# Patient Record
Sex: FEMALE | Race: WHITE | NOT HISPANIC OR LATINO | Employment: FULL TIME | URBAN - NONMETROPOLITAN AREA
[De-identification: names, ages, dates, MRNs, and addresses within clinical notes are randomized per-mention and may not be internally consistent; named-entity substitution may affect disease eponyms.]

---

## 2017-12-02 ENCOUNTER — HOSPITAL ENCOUNTER (INPATIENT)
Facility: HOSPITAL | Age: 57
LOS: 1 days | Discharge: HOME | DRG: 305 | End: 2017-12-04
Attending: EMERGENCY MEDICINE | Admitting: INTERNAL MEDICINE
Payer: COMMERCIAL

## 2017-12-02 ENCOUNTER — APPOINTMENT (EMERGENCY)
Dept: CT IMAGING | Facility: HOSPITAL | Age: 57
DRG: 305 | End: 2017-12-02
Payer: COMMERCIAL

## 2017-12-02 DIAGNOSIS — I10 ACCELERATED HYPERTENSION: ICD-10-CM

## 2017-12-02 DIAGNOSIS — R20.0 RIGHT UPPER EXTREMITY NUMBNESS: Primary | ICD-10-CM

## 2017-12-02 PROBLEM — Z91.19 MEDICALLY NONCOMPLIANT: Chronic | Status: ACTIVE | Noted: 2017-12-02

## 2017-12-02 PROBLEM — Z91.199 MEDICALLY NONCOMPLIANT: Chronic | Status: ACTIVE | Noted: 2017-12-02

## 2017-12-02 PROBLEM — F32.A DEPRESSION: Chronic | Status: ACTIVE | Noted: 2017-12-02

## 2017-12-02 LAB
ALBUMIN SERPL BCP-MCNC: 4 G/DL (ref 3.5–5)
ALP SERPL-CCNC: 94 U/L (ref 46–116)
ALT SERPL W P-5'-P-CCNC: 31 U/L (ref 12–78)
ANION GAP SERPL CALCULATED.3IONS-SCNC: 8 MMOL/L (ref 4–13)
APTT PPP: 31 SECONDS (ref 23–35)
AST SERPL W P-5'-P-CCNC: 28 U/L (ref 5–45)
BASOPHILS # BLD AUTO: 0.03 THOUSANDS/ΜL (ref 0–0.1)
BASOPHILS NFR BLD AUTO: 1 % (ref 0–1)
BILIRUB SERPL-MCNC: 0.5 MG/DL (ref 0.2–1)
BUN SERPL-MCNC: 17 MG/DL (ref 5–25)
CALCIUM SERPL-MCNC: 9.2 MG/DL (ref 8.3–10.1)
CHLORIDE SERPL-SCNC: 104 MMOL/L (ref 100–108)
CO2 SERPL-SCNC: 29 MMOL/L (ref 21–32)
CREAT SERPL-MCNC: 0.83 MG/DL (ref 0.6–1.3)
EOSINOPHIL # BLD AUTO: 0.04 THOUSAND/ΜL (ref 0–0.61)
EOSINOPHIL NFR BLD AUTO: 1 % (ref 0–6)
ERYTHROCYTE [DISTWIDTH] IN BLOOD BY AUTOMATED COUNT: 11.7 % (ref 11.6–15.1)
GFR SERPL CREATININE-BSD FRML MDRD: 80 ML/MIN/1.73SQ M
GLUCOSE SERPL-MCNC: 94 MG/DL (ref 65–140)
HCT VFR BLD AUTO: 37.3 % (ref 34.8–46.1)
HGB BLD-MCNC: 13 G/DL (ref 11.5–15.4)
HOLD SPECIMEN: NORMAL
INR PPP: 0.97 (ref 0.86–1.16)
LYMPHOCYTES # BLD AUTO: 1.95 THOUSANDS/ΜL (ref 0.6–4.47)
LYMPHOCYTES NFR BLD AUTO: 33 % (ref 14–44)
MCH RBC QN AUTO: 34.9 PG (ref 26.8–34.3)
MCHC RBC AUTO-ENTMCNC: 34.9 G/DL (ref 31.4–37.4)
MCV RBC AUTO: 100 FL (ref 82–98)
MONOCYTES # BLD AUTO: 0.62 THOUSAND/ΜL (ref 0.17–1.22)
MONOCYTES NFR BLD AUTO: 10 % (ref 4–12)
NEUTROPHILS # BLD AUTO: 3.3 THOUSANDS/ΜL (ref 1.85–7.62)
NEUTS SEG NFR BLD AUTO: 55 % (ref 43–75)
PLATELET # BLD AUTO: 269 THOUSANDS/UL (ref 149–390)
PMV BLD AUTO: 8.8 FL (ref 8.9–12.7)
POTASSIUM SERPL-SCNC: 4 MMOL/L (ref 3.5–5.3)
PROT SERPL-MCNC: 6.7 G/DL (ref 6.4–8.2)
PROTHROMBIN TIME: 12.8 SECONDS (ref 12.1–14.4)
RBC # BLD AUTO: 3.72 MILLION/UL (ref 3.81–5.12)
SODIUM SERPL-SCNC: 141 MMOL/L (ref 136–145)
TSH SERPL DL<=0.05 MIU/L-ACNC: 1.33 UIU/ML (ref 0.36–3.74)
WBC # BLD AUTO: 5.94 THOUSAND/UL (ref 4.31–10.16)

## 2017-12-02 PROCEDURE — 96374 THER/PROPH/DIAG INJ IV PUSH: CPT

## 2017-12-02 PROCEDURE — 99285 EMERGENCY DEPT VISIT HI MDM: CPT

## 2017-12-02 PROCEDURE — 80053 COMPREHEN METABOLIC PANEL: CPT | Performed by: EMERGENCY MEDICINE

## 2017-12-02 PROCEDURE — 93005 ELECTROCARDIOGRAM TRACING: CPT | Performed by: EMERGENCY MEDICINE

## 2017-12-02 PROCEDURE — 85025 COMPLETE CBC W/AUTO DIFF WBC: CPT | Performed by: EMERGENCY MEDICINE

## 2017-12-02 PROCEDURE — 70450 CT HEAD/BRAIN W/O DYE: CPT

## 2017-12-02 PROCEDURE — 36415 COLL VENOUS BLD VENIPUNCTURE: CPT | Performed by: EMERGENCY MEDICINE

## 2017-12-02 PROCEDURE — 84443 ASSAY THYROID STIM HORMONE: CPT | Performed by: EMERGENCY MEDICINE

## 2017-12-02 PROCEDURE — 93005 ELECTROCARDIOGRAM TRACING: CPT

## 2017-12-02 PROCEDURE — 85610 PROTHROMBIN TIME: CPT | Performed by: EMERGENCY MEDICINE

## 2017-12-02 PROCEDURE — 85730 THROMBOPLASTIN TIME PARTIAL: CPT | Performed by: EMERGENCY MEDICINE

## 2017-12-02 RX ORDER — HYDRALAZINE HYDROCHLORIDE 20 MG/ML
5 INJECTION INTRAMUSCULAR; INTRAVENOUS ONCE
Status: COMPLETED | OUTPATIENT
Start: 2017-12-02 | End: 2017-12-02

## 2017-12-02 RX ORDER — METOPROLOL TARTRATE 50 MG/1
50 TABLET, FILM COATED ORAL DAILY
COMMUNITY
End: 2017-12-04 | Stop reason: HOSPADM

## 2017-12-02 RX ORDER — POLYETHYLENE GLYCOL 3350 17 G/17G
17 POWDER, FOR SOLUTION ORAL DAILY
Status: DISCONTINUED | OUTPATIENT
Start: 2017-12-02 | End: 2017-12-04 | Stop reason: HOSPADM

## 2017-12-02 RX ORDER — CALCIUM CARBONATE 200(500)MG
1000 TABLET,CHEWABLE ORAL DAILY PRN
Status: DISCONTINUED | OUTPATIENT
Start: 2017-12-02 | End: 2017-12-04 | Stop reason: HOSPADM

## 2017-12-02 RX ORDER — HYDRALAZINE HYDROCHLORIDE 20 MG/ML
10 INJECTION INTRAMUSCULAR; INTRAVENOUS EVERY 6 HOURS PRN
Status: DISCONTINUED | OUTPATIENT
Start: 2017-12-02 | End: 2017-12-04 | Stop reason: HOSPADM

## 2017-12-02 RX ORDER — FLUOXETINE 20 MG/1
20 TABLET, FILM COATED ORAL DAILY
COMMUNITY

## 2017-12-02 RX ORDER — AMLODIPINE BESYLATE 10 MG/1
10 TABLET ORAL DAILY
Status: DISCONTINUED | OUTPATIENT
Start: 2017-12-02 | End: 2017-12-04 | Stop reason: HOSPADM

## 2017-12-02 RX ORDER — ONDANSETRON 2 MG/ML
4 INJECTION INTRAMUSCULAR; INTRAVENOUS EVERY 6 HOURS PRN
Status: DISCONTINUED | OUTPATIENT
Start: 2017-12-02 | End: 2017-12-04 | Stop reason: HOSPADM

## 2017-12-02 RX ORDER — MAGNESIUM HYDROXIDE/ALUMINUM HYDROXICE/SIMETHICONE 120; 1200; 1200 MG/30ML; MG/30ML; MG/30ML
30 SUSPENSION ORAL EVERY 6 HOURS PRN
Status: DISCONTINUED | OUTPATIENT
Start: 2017-12-02 | End: 2017-12-04 | Stop reason: HOSPADM

## 2017-12-02 RX ORDER — METOPROLOL TARTRATE 50 MG/1
50 TABLET, FILM COATED ORAL EVERY 12 HOURS SCHEDULED
Status: DISCONTINUED | OUTPATIENT
Start: 2017-12-02 | End: 2017-12-04 | Stop reason: HOSPADM

## 2017-12-02 RX ORDER — METOPROLOL TARTRATE 50 MG/1
50 TABLET, FILM COATED ORAL DAILY
Status: DISCONTINUED | OUTPATIENT
Start: 2017-12-02 | End: 2017-12-02

## 2017-12-02 RX ORDER — FLUOXETINE HYDROCHLORIDE 20 MG/1
20 CAPSULE ORAL DAILY
Status: DISCONTINUED | OUTPATIENT
Start: 2017-12-02 | End: 2017-12-04 | Stop reason: HOSPADM

## 2017-12-02 RX ORDER — DOCUSATE SODIUM 100 MG/1
100 CAPSULE, LIQUID FILLED ORAL 2 TIMES DAILY
Status: DISCONTINUED | OUTPATIENT
Start: 2017-12-02 | End: 2017-12-04 | Stop reason: HOSPADM

## 2017-12-02 RX ORDER — ACETAMINOPHEN 325 MG/1
650 TABLET ORAL EVERY 6 HOURS PRN
Status: DISCONTINUED | OUTPATIENT
Start: 2017-12-02 | End: 2017-12-04 | Stop reason: HOSPADM

## 2017-12-02 RX ADMIN — METOPROLOL TARTRATE 50 MG: 50 TABLET ORAL at 21:40

## 2017-12-02 RX ADMIN — AMLODIPINE BESYLATE 10 MG: 10 TABLET ORAL at 16:14

## 2017-12-02 RX ADMIN — HYDRALAZINE HYDROCHLORIDE 5 MG: 20 INJECTION INTRAMUSCULAR; INTRAVENOUS at 11:11

## 2017-12-02 RX ADMIN — ACETAMINOPHEN 650 MG: 325 TABLET, FILM COATED ORAL at 21:39

## 2017-12-02 RX ADMIN — ENOXAPARIN SODIUM 40 MG: 40 INJECTION SUBCUTANEOUS at 16:14

## 2017-12-02 RX ADMIN — POLYETHYLENE GLYCOL 3350 17 G: 17 POWDER, FOR SOLUTION ORAL at 16:14

## 2017-12-02 RX ADMIN — FLUOXETINE 20 MG: 20 CAPSULE ORAL at 16:13

## 2017-12-02 NOTE — H&P
History and Physical - [facility]    Patient Information: Steven Chinchilla 56 y o  female MRN: [id number]  Unit/Bed#: 759-12 Encounter: [id number]  Admitting Physician: Ramu Alex MD  PCP: Bonita Traylor DO  Date of Admission:  12/02/17    Assessment/Plan:    Hospital Problem List:     Principal Problem:    Hypertension, accelerated  Active Problems:    Medically noncompliant    Depression      Plan for the Primary Problem(s):  · Uncontrolled hypertension  · Admit to telemetry  · Vitals as per floor protocol  · Cardiac diet, low salt diet  · Ambulate as tolerated  · Labs:  CBC, BMP, LFTs, INR, PTT  · Resume home medications  · IV hydralazine 10 mg q 6 hours as needed for systolic blood pressure greater than 180  · Start patient on Norvasc 5 mg daily  · DVT prophylaxis:  Lovenox 40 mg subcu daily    Plan for Additional Problems:   · Medically noncompliant with home medication  · Depression    VTE Prophylaxis: Enoxaparin (Lovenox)  / sequential compression device   Code Status:  Full code  POLST: POLST is not applicable to this patient    Anticipated Length of Stay:  Patient will be admitted on an Observation basis with an anticipated length of stay of   2 midnights  Justification for Hospital Stay:  Uncontrolled hypertension    Total Time for Visit, including Counseling / Coordination of Care: 45 minutes  Greater than 50% of this total time spent on direct patient counseling and coordination of care  Chief Complaint:   Uncontrolled hypertension/pain and tingling sensation in the right arm     History of Present Illness:    Steven Chinchilla is a 56 y o  female who presents with uncontrolled hypertension and pain and tingling sensation in the right arm which lasted for 1 hour  Patient is noncompliant with home medications  Patient says she takes metoprolol 50 mg p o  daily and her primary care physician stopped lisinopril 10 mg p  o  daily last week    Patient did agree that she is non complaint with her medications  Patient denies headache, blurring of vision, diplopia, chest pain, palpitation, nausea, vomiting, diarrhea, abdominal pain       Review of Systems:    Review of Systems   Neurological: Positive for numbness  All other systems reviewed and are negative  Past Medical and Surgical History:     Past Medical History:   Diagnosis Date    Hypertension        History reviewed  No pertinent surgical history  Meds/Allergies:    Prior to Admission medications    Medication Sig Start Date End Date Taking? Authorizing Provider   FLUoxetine (PROzac) 20 MG tablet Take 20 mg by mouth daily   Yes Historical Provider, MD   metoprolol tartrate (LOPRESSOR) 50 mg tablet Take 50 mg by mouth daily   Yes Historical Provider, MD     I have reviewed home medications with patient personally  Allergies: No Known Allergies    Social History:     Marital Status: Single   Occupation:  Unknown  Patient Pre-hospital Living Situation:  Home  Patient Pre-hospital Level of Mobility:  Good  Patient Pre-hospital Diet Restrictions:  No  Substance Use History:   History   Alcohol Use No     History   Smoking Status    Former Smoker   Smokeless Tobacco    Never Used     History   Drug Use No       Family History:    History reviewed  No pertinent family history  Physical Exam:     Vitals:   Blood Pressure: (!) 184/97 (12/02/17 1300)  Pulse: 65 (12/02/17 1300)  Temperature: 97 6 °F (36 4 °C) (12/02/17 1300)  Temp Source: Temporal (12/02/17 1300)  Respirations: 16 (12/02/17 1300)  Height: 4' 11" (149 9 cm) (12/02/17 1300)  Weight - Scale: 45 kg (99 lb 3 3 oz) (12/02/17 1300)  SpO2: 98 % (12/02/17 1300)    Physical Exam   Constitutional: She is oriented to person, place, and time  She appears well-developed and well-nourished  HENT:   Head: Normocephalic and atraumatic  Eyes: Conjunctivae and EOM are normal  Pupils are equal, round, and reactive to light  Neck: Normal range of motion   Neck supple  Cardiovascular: Normal rate, regular rhythm, normal heart sounds and intact distal pulses  Pulmonary/Chest: Effort normal and breath sounds normal    Abdominal: Soft  Bowel sounds are normal    Musculoskeletal: Normal range of motion  Neurological: She is alert and oriented to person, place, and time  She has normal reflexes  Skin: Skin is warm and dry  Vitals reviewed  Additional Data:     Lab Results: I have personally reviewed pertinent reports  Results from last 7 days  Lab Units 12/02/17  1045   WBC Thousand/uL 5 94   HEMOGLOBIN g/dL 13 0   HEMATOCRIT % 37 3   PLATELETS Thousands/uL 269   NEUTROS PCT % 55   LYMPHS PCT % 33   MONOS PCT % 10   EOS PCT % 1       Results from last 7 days  Lab Units 12/02/17  1045   SODIUM mmol/L 141   POTASSIUM mmol/L 4 0   CHLORIDE mmol/L 104   CO2 mmol/L 29   BUN mg/dL 17   CREATININE mg/dL 0 83   CALCIUM mg/dL 9 2   TOTAL PROTEIN g/dL 6 7   BILIRUBIN TOTAL mg/dL 0 50   ALK PHOS U/L 94   ALT U/L 31   AST U/L 28   GLUCOSE RANDOM mg/dL 94       Results from last 7 days  Lab Units 12/02/17  1045   INR  0 97       Imaging: I have personally reviewed pertinent reports  Ct Head Without Contrast    Result Date: 12/2/2017  Narrative: CT BRAIN - WITHOUT CONTRAST INDICATION:  Right arm numbness COMPARISON:  None  TECHNIQUE:  CT examination of the brain was performed  In addition to axial images, coronal reformatted images were created and submitted for interpretation  Radiation dose length product (DLP) for this visit:  916 mGy-cm   This examination, like all CT scans performed in the [address] [place] [address], was performed utilizing techniques to minimize radiation dose exposure, including the use of iterative reconstruction and automated exposure control  IMAGE QUALITY:  Diagnostic  FINDINGS:  PARENCHYMA:  No intracranial mass, mass effect or midline shift  No CT signs of acute infarction  There is no parenchymal hemorrhage       VENTRICLES AND EXTRA-AXIAL SPACES:  Normal for patient's age  VISUALIZED ORBITS AND PARANASAL SINUSES:  Unremarkable  CALVARIUM AND EXTRACRANIAL SOFT TISSUES:   Normal      Impression: No acute intracranial abnormality  Workstation performed: [id number]       EKG, Pathology, and Other Studies Reviewed on Admission:   · EKG:  No ST T wave changes noted    Allscripts / Epic Records Reviewed: Yes     ** Please Note: This note has been constructed using a voice recognition system   **

## 2017-12-02 NOTE — ED NOTES
Pt ambulated to and from bathroom  Pt tolerated well  Pt resting in bed with call bell in reach  Warm blanket provided        Aleksandra Andres RN  12/02/17 1839

## 2017-12-02 NOTE — ED NOTES
Dr Amador Peacock informed of patients HTN  No new orders at this time       Elli Baer, RN  12/02/17 2942

## 2017-12-03 PROBLEM — I10 HYPERTENSION, ACCELERATED: Status: RESOLVED | Noted: 2017-12-02 | Resolved: 2017-12-03

## 2017-12-03 LAB
ANION GAP SERPL CALCULATED.3IONS-SCNC: 7 MMOL/L (ref 4–13)
BUN SERPL-MCNC: 14 MG/DL (ref 5–25)
CALCIUM SERPL-MCNC: 9.1 MG/DL (ref 8.3–10.1)
CHLORIDE SERPL-SCNC: 102 MMOL/L (ref 100–108)
CO2 SERPL-SCNC: 28 MMOL/L (ref 21–32)
CREAT SERPL-MCNC: 0.7 MG/DL (ref 0.6–1.3)
ERYTHROCYTE [DISTWIDTH] IN BLOOD BY AUTOMATED COUNT: 11.8 % (ref 11.6–15.1)
GFR SERPL CREATININE-BSD FRML MDRD: 98 ML/MIN/1.73SQ M
GLUCOSE SERPL-MCNC: 104 MG/DL (ref 65–140)
HCT VFR BLD AUTO: 40.8 % (ref 34.8–46.1)
HCYS SERPL-SCNC: 7.9 UMOL/L (ref 3.7–11.2)
HGB BLD-MCNC: 14.2 G/DL (ref 11.5–15.4)
MCH RBC QN AUTO: 34.5 PG (ref 26.8–34.3)
MCHC RBC AUTO-ENTMCNC: 34.8 G/DL (ref 31.4–37.4)
MCV RBC AUTO: 99 FL (ref 82–98)
PLATELET # BLD AUTO: 277 THOUSANDS/UL (ref 149–390)
PMV BLD AUTO: 9.5 FL (ref 8.9–12.7)
POTASSIUM SERPL-SCNC: 3.9 MMOL/L (ref 3.5–5.3)
RBC # BLD AUTO: 4.11 MILLION/UL (ref 3.81–5.12)
SODIUM SERPL-SCNC: 137 MMOL/L (ref 136–145)
VIT B12 SERPL-MCNC: 795 PG/ML (ref 100–900)
WBC # BLD AUTO: 5.91 THOUSAND/UL (ref 4.31–10.16)

## 2017-12-03 PROCEDURE — 80048 BASIC METABOLIC PNL TOTAL CA: CPT | Performed by: INTERNAL MEDICINE

## 2017-12-03 PROCEDURE — 82607 VITAMIN B-12: CPT | Performed by: INTERNAL MEDICINE

## 2017-12-03 PROCEDURE — 97162 PT EVAL MOD COMPLEX 30 MIN: CPT

## 2017-12-03 PROCEDURE — G8979 MOBILITY GOAL STATUS: HCPCS

## 2017-12-03 PROCEDURE — G8978 MOBILITY CURRENT STATUS: HCPCS

## 2017-12-03 PROCEDURE — 85027 COMPLETE CBC AUTOMATED: CPT | Performed by: INTERNAL MEDICINE

## 2017-12-03 PROCEDURE — 93005 ELECTROCARDIOGRAM TRACING: CPT | Performed by: INTERNAL MEDICINE

## 2017-12-03 PROCEDURE — 83090 ASSAY OF HOMOCYSTEINE: CPT | Performed by: INTERNAL MEDICINE

## 2017-12-03 RX ORDER — METOPROLOL TARTRATE 50 MG/1
50 TABLET, FILM COATED ORAL EVERY 12 HOURS SCHEDULED
Qty: 60 TABLET | Refills: 0 | Status: SHIPPED | OUTPATIENT
Start: 2017-12-03

## 2017-12-03 RX ORDER — ASPIRIN 81 MG/1
81 TABLET ORAL DAILY
Status: DISCONTINUED | OUTPATIENT
Start: 2017-12-03 | End: 2017-12-04 | Stop reason: HOSPADM

## 2017-12-03 RX ORDER — AMLODIPINE BESYLATE 10 MG/1
10 TABLET ORAL DAILY
Qty: 30 TABLET | Refills: 0 | Status: SHIPPED | OUTPATIENT
Start: 2017-12-04

## 2017-12-03 RX ADMIN — ENOXAPARIN SODIUM 40 MG: 40 INJECTION SUBCUTANEOUS at 13:58

## 2017-12-03 RX ADMIN — POLYETHYLENE GLYCOL 3350 17 G: 17 POWDER, FOR SOLUTION ORAL at 08:33

## 2017-12-03 RX ADMIN — AMLODIPINE BESYLATE 10 MG: 10 TABLET ORAL at 08:33

## 2017-12-03 RX ADMIN — METOPROLOL TARTRATE 50 MG: 50 TABLET ORAL at 08:33

## 2017-12-03 RX ADMIN — METOPROLOL TARTRATE 50 MG: 50 TABLET ORAL at 20:54

## 2017-12-03 RX ADMIN — ASPIRIN 81 MG: 81 TABLET, COATED ORAL at 13:58

## 2017-12-03 RX ADMIN — DOCUSATE SODIUM 100 MG: 100 CAPSULE, LIQUID FILLED ORAL at 17:28

## 2017-12-03 RX ADMIN — DOCUSATE SODIUM 100 MG: 100 CAPSULE, LIQUID FILLED ORAL at 08:33

## 2017-12-03 RX ADMIN — FLUOXETINE 20 MG: 20 CAPSULE ORAL at 08:33

## 2017-12-03 RX ADMIN — ACETAMINOPHEN 650 MG: 325 TABLET, FILM COATED ORAL at 08:32

## 2017-12-03 NOTE — SOCIAL WORK
Cm met with the patient to evaluate the patients prior function and living situation and any barriers to d/c and form a safe d/c plan  Cm also evaluated the patient for any services in the home or needs for services  Cm will follow the patient and address any needs and address all concerns  The patient is at home and she is independent at home with all adls and she uses no dme  Her pcp is Dr Cyndie Marks in [facility] and she does drive and will have no needs on d/c   She was as an observation and she was d/c to home but she states that she had numbness and tingling radiating down her arm and the d/c was cancelled

## 2017-12-03 NOTE — DISCHARGE SUMMARY
Discharge Summary - [facility]    Patient Information: Ceasar Rees 56 y o  female MRN: [id number]  Unit/Bed#: 726-32 Encounter: [id number]    Discharging Physician / Practitioner: Sam Garcia MD  PCP: Ny Munoz DO  Admission Date: 12/2/2017  Discharge Date: 12/03/17    Reason for Admission:  Uncontrolled hypertension  Discharge Diagnoses:     Principal Problem (Resolved): Hypertension, accelerated  Active Problems:    Medically noncompliant    Depression      Consultations During Hospital Stay:  · None    Procedures Performed:     · CT head without contrast    Significant Findings / Test Results:     · CT head without contrast:  No acute intracranial abnormality    Incidental Findings:   · None     Test Results Pending at Discharge (will require follow up): · None     Outpatient Tests Requested:  · None    Complications:  None    Hospital Course:     Ceasar Rees is a 56 y o  female patient who originally presented to the hospital on 12/2/2017 due to uncontrolled hypertension and pain and tingling sensation in the right arm which lasted for 1 hour  Patient is noncompliant with home medications  Patient says she takes metoprolol 50 mg p o  daily and her primary care physician stopped lisinopril 10 mg p  o  daily last week  Patient did agree that she is non complaint with her medications  Patient denies headache, blurring of vision, diplopia, chest pain, palpitation, nausea, vomiting, diarrhea, abdominal pain       Patient was admitted to telemetry under observation  Patient noncompliant with home medications  Metoprolol 50 mg p o  b i d  was initiated and Norvasc 10 mg p  o  daily was also started on the patient  Patient blood pressure came back to normal limits  Patient is asymptomatic  I stressed the importance of being compliant with the home medication to the patient  Advised the patient to follow up with the primary care physician    Patient agreed to the plan  Patient wanted to go home today        Condition at Discharge: good     Discharge Day Visit / Exam:     Subjective:  Patient seen examined at bedside  Patient says she is doing fine and wants to go home today  Patient denies fever, chills, nausea, vomiting, diarrhea, abdominal pain, chest pain, palpitation, shortness of breath, headache, blurring of vision, diplopia, confusion  Vitals: Blood Pressure: 134/76 (12/03/17 0736)  Pulse: 62 (12/03/17 0736)  Temperature: 98 °F (36 7 °C) (12/03/17 0736)  Temp Source: Temporal (12/03/17 0736)  Respirations: 16 (12/03/17 0736)  Height: 4' 11" (149 9 cm) (12/02/17 1300)  Weight - Scale: 43 7 kg (96 lb 6 4 oz) (12/03/17 0600)  SpO2: 97 % (12/03/17 0736)  Exam:   Physical Exam   Constitutional: She is oriented to person, place, and time  She appears well-developed and well-nourished  HENT:   Head: Normocephalic and atraumatic  Eyes: Conjunctivae and EOM are normal  Pupils are equal, round, and reactive to light  Neck: Normal range of motion  Neck supple  Cardiovascular: Normal rate, regular rhythm, normal heart sounds and intact distal pulses  Pulmonary/Chest: Effort normal and breath sounds normal    Abdominal: Soft  Bowel sounds are normal    Musculoskeletal: Normal range of motion  Neurological: She is alert and oriented to person, place, and time  She has normal reflexes  Skin: Skin is warm and dry  Vitals reviewed  Discharge instructions/Information to patient and family:   See after visit summary for information provided to patient and family  Provisions for Follow-Up Care:  See after visit summary for information related to follow-up care and any pertinent home health orders  Disposition:     Home    For Discharges to [facility]:   · Not Applicable to this Patient - Not Applicable to this Patient    Planned Readmission:  No     Discharge Statement:  I spent 60 minutes discharging the patient   This time was spent on the day of discharge  I had direct contact with the patient on the day of discharge  Greater than 50% of the total time was spent examining patient, answering all patient questions, arranging and discussing plan of care with patient as well as directly providing post-discharge instructions  Additional time then spent on discharge activities  Discharge Medications:  See after visit summary for reconciled discharge medications provided to patient and family        ** Please Note: This note has been constructed using a voice recognition system **

## 2017-12-03 NOTE — DISCHARGE INSTRUCTIONS
Chronic Hypertension, Ambulatory Care   GENERAL INFORMATION:   Chronic hypertension  is a long-term condition in which your blood pressure (BP) is higher than normal  Your BP is the force of your blood moving against the walls of your arteries  Hypertension is a BP of 140/90 or higher  Common symptoms include the following:   · Headache     · Blurred vision    · Chest pain     · Dizziness or weakness     · Trouble breathing     · Nosebleeds  Seek immediate care for the following symptoms:   · Severe headache or vision loss    · Weakness in an arm or leg    · Confusion or difficulty speaking    · Discomfort in your chest that feels like squeezing, pressure, fullness, or pain    · Suddenly feeling lightheaded or trouble breathing    · Pain or discomfort in your back, neck, jaw, stomach, or arm  Treatment for chronic hypertension  may include medicine to lower your BP  You may also need to make lifestyle changes  Take your medicine exactly as directed  Manage chronic hypertension:   · Take your BP at home  Sit and rest for 5 minutes before you take your BP  Extend your arm and support it on a flat surface  Your arm should be at the same level as your heart  Follow the directions that came with your BP monitor  If possible, take at least 2 BP readings each time  Take your BP at least twice a day at the same times each day, such as morning and evening  Keep a log of your BP readings and bring it to your follow-up visits  · Eat less sodium (salt)  Do not add sodium to your food  Limit foods that are high in sodium, such as canned foods, potato chips, and cold cuts  Your healthcare provider may suggest that you follow the [address] [place] [address] Street  The plan is low in sodium, unhealthy fats, and total fat  It is high in potassium, calcium, and fiber  · Exercise regularly  Exercise at least 30 minutes per day, on most days of the week  This will help decrease your BP   Ask your healthcare provider about the best exercise plan for you  · Limit alcohol  Women should limit alcohol to 1 drink a day  Men should limit alcohol to 2 drinks a day  A drink of alcohol is 12 ounces of beer, 5 ounces of wine, or 1½ ounces of liquor  · Do not smoke  If you smoke, it is never too late to quit  Smoking can increase your BP  Smoking also worsens other health conditions you may have that can increase your risk for hypertension  Ask your healthcare provider for information if you need help quitting  Follow up with your healthcare provider as directed: You will need to return to have your BP checked and to have other lab tests done  Write down your questions so you remember to ask them during your visits  CARE AGREEMENT:   You have the right to help plan your care  Learn about your health condition and how it may be treated  Discuss treatment options with your caregivers to decide what care you want to receive  You always have the right to refuse treatment  The above information is an  only  It is not intended as medical advice for individual conditions or treatments  Talk to your doctor, nurse or pharmacist before following any medical regimen to see if it is safe and effective for you  © 2014 [address] is for End User's use only and may not be sold, redistributed or otherwise used for commercial purposes  All illustrations and images included in CareNotes® are the copyrighted property of [employer]  or [employer]  Chronic Hypertension   AMBULATORY CARE:   Hypertension  is high blood pressure (BP)  Your BP is the force of your blood moving against the walls of your arteries  Normal BP is less than 120/80  Prehypertension is between 120/80 and 139/89  Hypertension is 140/90 or higher  Hypertension causes your BP to get so high that your heart has to work much harder than normal  This can damage your heart   Chronic hypertension is a long-term condition that you can control with a healthy lifestyle or medicines  A controlled blood pressure helps protect your organs, such as your heart, lungs, brain, and kidneys  Common symptoms include the following:   · Headache     · Blurred vision    · Chest pain     · Dizziness or weakness     · Trouble breathing     · Nosebleeds  Call 911 for any of the following:   · You have discomfort in your chest that feels like squeezing, pressure, fullness, or pain  · You become confused or have difficulty speaking  · You suddenly feel lightheaded or have trouble breathing  · You have pain or discomfort in your back, neck, jaw, stomach, or arm  Seek care immediately if:   · You have a severe headache or vision loss  · You have weakness in an arm or leg  Contact your healthcare provider if:   · You feel faint, dizzy, confused, or drowsy  · You have been taking your BP medicine and your BP is still higher than your healthcare provider says it should be  · You have questions or concerns about your condition or care  Treatment for chronic hypertension  may include medicine to lower your BP and lower your cholesterol level  A low cholesterol level helps prevent heart disease and makes it easier to control your blood pressure  Heart disease can make your blood pressure harder to control  You may also need to make lifestyle changes  Take your medicine exactly as directed  Manage chronic hypertension:  Talk with your healthcare provider about these and other ways to manage hypertension:  · Take your BP at home  Sit and rest for 5 minutes before you take your BP  Extend your arm and support it on a flat surface  Your arm should be at the same level as your heart  Follow the directions that came with your BP monitor  If possible, take at least 2 BP readings each time  Take your BP at least twice a day at the same times each day, such as morning and evening  Keep a record of your BP readings and bring it to your follow-up visits   Ask your healthcare provider what your blood pressure should be  · Limit sodium (salt) as directed  Too much sodium can affect your fluid balance  Check labels to find low-sodium or no-salt-added foods  Some low-sodium foods use potassium salts for flavor  Too much potassium can also cause health problems  Your healthcare provider will tell you how much sodium and potassium are safe for you to have in a day  He or she may recommend that you limit sodium to 2,300 mg a day  · Follow the meal plan recommended by your healthcare provider  A dietitian or your provider can give you more information on low-sodium plans or the DASH (Dietary Approaches to Stop Hypertension) eating plan  The DASH plan is low in sodium, unhealthy fats, and total fat  It is high in potassium, calcium, and fiber  · Exercise to maintain a healthy weight  Exercise at least 30 minutes per day, on most days of the week  This will help decrease your blood pressure  Ask about the best exercise plan for you  · Decrease stress  This may help lower your BP  Learn ways to relax, such as deep breathing or listening to music  · Limit alcohol  Women should limit alcohol to 1 drink a day  Men should limit alcohol to 2 drinks a day  A drink of alcohol is 12 ounces of beer, 5 ounces of wine, or 1½ ounces of liquor  · Do not smoke  Nicotine and other chemicals in cigarettes and cigars can increase your BP and also cause lung damage  Ask your healthcare provider for information if you currently smoke and need help to quit  E-cigarettes or smokeless tobacco still contain nicotine  Talk to your healthcare provider before you use these products  Follow up with your healthcare provider as directed: You will need to return to have your BP checked and to have other lab tests done  Write down your questions so you remember to ask them during your visits     © 2017 [employer] Information is for End User's use only and may not be sold, redistributed or otherwise used for commercial purposes  All illustrations and images included in CareNotes® are the copyrighted property of [employer] [facility] , [employer]  or [employer]  The above information is an  only  It is not intended as medical advice for individual conditions or treatments  Talk to your doctor, nurse or pharmacist before following any medical regimen to see if it is safe and effective for you  Chronic Hypertension   WHAT YOU NEED TO KNOW:   Hypertension is high blood pressure (BP)  Your BP is the force of your blood moving against the walls of your arteries  Normal BP is less than 120/80  Prehypertension is between 120/80 and 139/89  Hypertension is 140/90 or higher  Hypertension causes your BP to get so high that your heart has to work much harder than normal  This can damage your heart  Chronic hypertension is a long-term condition that you can control with a healthy lifestyle or medicines  A controlled blood pressure helps protect your organs, such as your heart, lungs, brain, and kidneys  DISCHARGE INSTRUCTIONS:   Call 911 for any of the following:   · You have discomfort in your chest that feels like squeezing, pressure, fullness, or pain  · You become confused or have difficulty speaking  · You suddenly feel lightheaded or have trouble breathing  · You have pain or discomfort in your back, neck, jaw, stomach, or arm  Seek care immediately if:   · You have a severe headache or vision loss  · You have weakness in an arm or leg  Contact your healthcare provider if:   · You feel faint, dizzy, confused, or drowsy  · You have been taking your BP medicine and your BP is still higher than your healthcare provider says it should be  · You have questions or concerns about your condition or care  Medicines: You may need any of the following:  · Medicine  may be used to help lower your BP   You may need more than one type of medicine  Take the medicine exactly as directed  · Diuretics  help decrease extra fluid that collects in your body  This will help lower your BP  You may urinate more often while you take this medicine  · Cholesterol medicine  helps lower your cholesterol level  A low cholesterol level helps prevent heart disease and makes it easier to control your blood pressure  · Take your medicine as directed  Contact your healthcare provider if you think your medicine is not helping or if you have side effects  Tell him or her if you are allergic to any medicine  Keep a list of the medicines, vitamins, and herbs you take  Include the amounts, and when and why you take them  Bring the list or the pill bottles to follow-up visits  Carry your medicine list with you in case of an emergency  Follow up with your healthcare provider as directed: You will need to return to have your blood pressure checked and to have other lab tests done  Write down your questions so you remember to ask them during your visits  Manage chronic hypertension:  Talk with your healthcare provider about these and other ways to manage hypertension:  · Take your BP at home  Sit and rest for 5 minutes before you take your BP  Extend your arm and support it on a flat surface  Your arm should be at the same level as your heart  Follow the directions that came with your BP monitor  If possible, take at least 2 BP readings each time  Take your BP at least twice a day at the same times each day, such as morning and evening  Keep a record of your BP readings and bring it to your follow-up visits  Ask your healthcare provider what your blood pressure should be  · Limit sodium (salt) as directed  Too much sodium can affect your fluid balance  Check labels to find low-sodium or no-salt-added foods  Some low-sodium foods use potassium salts for flavor  Too much potassium can also cause health problems   Your healthcare provider will tell you how much sodium and potassium are safe for you to have in a day  He or she may recommend that you limit sodium to 2,300 mg a day  · Follow the meal plan recommended by your healthcare provider  A dietitian or your provider can give you more information on low-sodium plans or the DASH (Dietary Approaches to Stop Hypertension) eating plan  The DASH plan is low in sodium, unhealthy fats, and total fat  It is high in potassium, calcium, and fiber  · Exercise to maintain a healthy weight  Exercise at least 30 minutes per day, on most days of the week  This will help decrease your blood pressure  Ask about the best exercise plan for you  · Decrease stress  This may help lower your BP  Learn ways to relax, such as deep breathing or listening to music  · Limit alcohol  Women should limit alcohol to 1 drink a day  Men should limit alcohol to 2 drinks a day  A drink of alcohol is 12 ounces of beer, 5 ounces of wine, or 1½ ounces of liquor  · Do not smoke  Nicotine and other chemicals in cigarettes and cigars can increase your BP and also cause lung damage  Ask your healthcare provider for information if you currently smoke and need help to quit  E-cigarettes or smokeless tobacco still contain nicotine  Talk to your healthcare provider before you use these products  © 2017 [address]  Information is for End User's use only and may not be sold, redistributed or otherwise used for commercial purposes  All illustrations and images included in CareNotes® are the copyrighted property of A [employer] A M [employer]  or [employer]  The above information is an  only  It is not intended as medical advice for individual conditions or treatments  Talk to your doctor, nurse or pharmacist before following any medical regimen to see if it is safe and effective for you  Chronic Hypertension   WHAT YOU NEED TO KNOW:   What is chronic hypertension?   Hypertension is high blood pressure (BP)  Your BP is the force of your blood moving against the walls of your arteries  Normal BP is less than 120/80  Prehypertension is between 120/80 and 139/89  Hypertension is 140/90 or higher  Hypertension causes your BP to get so high that your heart has to work much harder than normal  This can damage your heart  Chronic hypertension is a long-term condition that you can control with a healthy lifestyle or medicines  A controlled blood pressure helps protect your organs, such as your heart, lungs, brain, and kidneys  What increases my risk for chronic hypertension? · Age older than 54 years (men)    · Age older than 72 years (women)    · A family history of hypertension or heart disease     · Obesity or lack of exercise     · Eating too much sodium (salt)    · Stress     · Use of tobacco, alcohol, or illegal drugs     · A medical condition, such as diabetes, high cholesterol, or kidney disease    · Medicines, such as steroids or birth control pills  What are the signs and symptoms of chronic hypertension? You may have no signs or symptoms, or you may have any of the following:  · Headache     · Blurred vision    · Chest pain     · Dizziness or weakness     · Trouble breathing     · Nosebleeds  How is chronic hypertension diagnosed? Your healthcare provider will ask about your symptoms and the medicines you take  He will also ask if you have a family history of high blood pressure and about any health conditions you have  He will also check your blood pressure and weight and examine your heart, lungs, and eyes  You may need any of the following tests:  · Blood tests  may help healthcare providers find the cause of your hypertension  Blood tests can also help find other health problems caused by hypertension  · Urine tests  will be done to check your kidneys  How is chronic hypertension treated? Your healthcare provider will recommend lifestyle changes to lower your BP   You may also need the following medicines:  · Medicine  may be used to help lower your B  You may need more than one type of medicine  Take the medicine exactly as directed  · Diuretics  help decrease extra fluid that collects in your body  This will help lower your BP  You may urinate more often while you take this medicine  · Cholesterol medicine  helps lower your cholesterol level  A low cholesterol level helps prevent heart disease and makes it easier to control your blood pressure  What can I do to manage chronic hypertension? Talk with your healthcare provider about these and other ways to manage chronic hypertension:  · Take your BP at home  Sit and rest for 5 minutes before you take your BP  Extend your arm and support it on a flat surface  Your arm should be at the same level as your heart  Follow the directions that came with your BP monitor  If possible, take at least 2 BP readings each time  Take your BP at least twice a day at the same times each day, such as morning and evening  Keep a record of your BP readings and bring it to your follow-up visits  Ask your healthcare provider what your blood pressure should be  · Limit sodium (salt) as directed  Too much sodium can affect your fluid balance  Check labels to find low-sodium or no-salt-added foods  Some low-sodium foods use potassium salts for flavor  Too much potassium can also cause health problems  Your healthcare provider will tell you how much sodium and potassium are safe for you to have in a day  He or she may recommend that you limit sodium to 2,300 mg a day  · Follow the meal plan recommended by your healthcare provider  A dietitian or your provider can give you more information on low-sodium plans or the DASH (Dietary Approaches to Stop Hypertension) eating plan  The DASH plan is low in sodium, unhealthy fats, and total fat  It is high in potassium, calcium, and fiber  · Exercise to maintain a healthy weight  Exercise at least 30 minutes per day, on most days of the week  This will help decrease your blood pressure  Ask about the best exercise plan for you  · Decrease stress  This may help lower your BP  Learn ways to relax, such as deep breathing or listening to music  · Limit alcohol  Women should limit alcohol to 1 drink a day  Men should limit alcohol to 2 drinks a day  A drink of alcohol is 12 ounces of beer, 5 ounces of wine, or 1½ ounces of liquor  · Do not smoke  Nicotine and other chemicals in cigarettes and cigars can increase your BP and also cause lung damage  Ask your healthcare provider for information if you currently smoke and need help to quit  E-cigarettes or smokeless tobacco still contain nicotine  Talk to your healthcare provider before you use these products  Call 911 for any of the following:   · You have discomfort in your chest that feels like squeezing, pressure, fullness, or pain  · You become confused or have difficulty speaking  · You suddenly feel lightheaded or have trouble breathing  · You have pain or discomfort in your back, neck, jaw, stomach, or arm  When should I seek immediate care? · You have a severe headache or vision loss  · You have weakness in an arm or leg  When should I contact my healthcare provider? · You feel faint, dizzy, confused, or drowsy  · You have been taking your BP medicine and your BP is still higher than your healthcare provider says it should be  · You have questions or concerns about your condition or care  CARE AGREEMENT:   You have the right to help plan your care  Learn about your health condition and how it may be treated  Discuss treatment options with your caregivers to decide what care you want to receive  You always have the right to refuse treatment  The above information is an  only  It is not intended as medical advice for individual conditions or treatments   Talk to your doctor, nurse or pharmacist before following any medical regimen to see if it is safe and effective for you  © 2017 [address] Information is for End User's use only and may not be sold, redistributed or otherwise used for commercial purposes  All illustrations and images included in CareNotes® are the copyrighted property of A [employer]  or [employer]  Depression   WHAT YOU NEED TO KNOW:   Depression is a medical condition that causes feelings of sadness or hopelessness that do not go away  Depression may cause you to lose interest in things you used to enjoy  These feelings may interfere with your daily life  DISCHARGE INSTRUCTIONS:   Call 911 for any of the following:   · You think about harming yourself or someone else  Contact your healthcare provider if:   · Your symptoms do not improve  · You cannot make it to your next appointment  · You have new symptoms  · You have questions or concerns about your condition or care  Medicines:   · Antidepressants  may be given to improve or balance your mood  You may need to take this medicine for several weeks before you begin to feel better  · Take your medicine as directed  Contact your healthcare provider if you think your medicine is not helping or if you have side effects  Tell him of her if you are allergic to any medicine  Keep a list of the medicines, vitamins, and herbs you take  Include the amounts, and when and why you take them  Bring the list or the pill bottles to follow-up visits  Carry your medicine list with you in case of an emergency  Therapy  may be used to treat your depression  A therapist will help you learn to cope with your thoughts and feelings  This can be done alone or in a group  It may also be done with family members or a significant other  Self-care:   · Get regular physical activity  Try to exercise for 30 minutes, 3 to 5 days a week   Work with your healthcare provider to develop an exercise plan that you enjoy  Physical activity may improve your symptoms  · Get enough sleep  Create a routine to help you relax before bed  You can listen to music, read, or do yoga  Try to go to bed and wake up at the same time every day  Sleep is important for emotional health  · Eat a variety of healthy foods from all of the food groups  A healthy meal plan is low in fat, salt, and added sugar  Ask your healthcare provider for more information about a meal plan that is right for you  · Do not drink alcohol or use drugs  Alcohol and drugs can make your symptoms worse  Follow up with your healthcare provider as directed: Your healthcare provider will monitor your progress at follow-up visits  He or she will also monitor your medicine if you take antidepressants  Your healthcare provider will ask if the medicine is helping  Tell him or her about any side effects or problems you may have with your medicine  The type or amount of medicine may need to be changed  Write down your questions so you remember to ask them during your visits  © 2017 [address]  Information is for End User's use only and may not be sold, redistributed or otherwise used for commercial purposes  All illustrations and images included in CareNotes® are the copyrighted property of [employer]  or [employer]  The above information is an  only  It is not intended as medical advice for individual conditions or treatments  Talk to your doctor, nurse or pharmacist before following any medical regimen to see if it is safe and effective for you  Depression   AMBULATORY CARE:   Depression  is a medical condition that causes feelings of sadness or hopelessness that do not go away  Depression may cause you to lose interest in things you used to enjoy  These feelings may interfere with your daily life    Common symptoms include the following:   · Appetite changes, or weight gain or loss    · Trouble going to sleep or staying asleep, or sleeping too much    · Fatigue or lack of energy    · Feeling restless, irritable, or withdrawn    · Feeling worthless, hopeless, discouraged, or guilty    · Trouble concentrating, remembering things, doing daily tasks, or making decisions    · Thoughts about hurting or killing yourself  Call 911 for any of the following:   · You think about harming yourself or someone else  Contact your healthcare provider if:   · Your symptoms do not improve  · You cannot make it to your next appointment  · You have new symptoms  · You have questions or concerns about your condition or care  Treatment for depression  may include medicine to improve or balance your mood  Therapy may also be used to treat your depression  A therapist will help you learn to cope with your thoughts and feelings  Therapy can be done alone or in a group  It may also be done with family members or a significant other  Self-care:   · Get regular physical activity  Try to exercise for 30 minutes, 3 to 5 days a week  Work with your healthcare provider to develop an exercise plan that you enjoy  Physical activity may improve your symptoms  · Get enough sleep  Create a routine to help you relax before bed  You can listen to music, read, or do yoga  Try to go to bed and wake up at the same time every day  Sleep is important for emotional health  · Eat a variety of healthy foods from all of the food groups  A healthy meal plan is low in fat, salt, and added sugar  Ask your healthcare provider for more information about a meal plan that is right for you  · Do not drink alcohol or use drugs  Alcohol and drugs can make your symptoms worse  Follow up with your healthcare provider as directed: Your healthcare provider will monitor your progress at follow-up visits  He or she will also monitor your medicine if you take antidepressants  Your healthcare provider will ask if the medicine is helping   Tell him or her about any side effects or problems you may have with your medicine  The type or amount of medicine may need to be changed  Write down your questions so you remember to ask them during your visits  © 2017 [address] Information is for End User's use only and may not be sold, redistributed or otherwise used for commercial purposes  All illustrations and images included in CareNotes® are the copyrighted property of [employer]  or [employer]  The above information is an  only  It is not intended as medical advice for individual conditions or treatments  Talk to your doctor, nurse or pharmacist before following any medical regimen to see if it is safe and effective for you  Depression, Ambulatory Care   GENERAL INFORMATION:   Depression  is a medical condition that causes feelings of sadness or hopelessness that do not go away  Depression may cause you to lose interest in things you used to enjoy  These feelings may interfere with your daily life  Common symptoms include the following:   · Appetite changes, or weight gain or loss    · Trouble going to sleep or staying asleep, or sleeping too much    · Fatigue or lack of energy    · Feeling restless, irritable, or withdrawn    · Feeling worthless, hopeless, discouraged, or very guilty    · Trouble concentrating, remembering things, doing daily tasks, or making decisions    · Thoughts about hurting or killing yourself  Seek immediate care for the following symptoms:   · You think about harming yourself or someone else  Treatment for depression  may include medicine to improve or balance your mood  Therapy may also be used to treat your depression  A therapist will help you learn to cope with your thoughts and feelings  Therapy can be done alone or in a group  It may also be done with family members or a significant other  Manage depression:   · Get regular physical activity    Try to exercise for 30 minutes, 3 to 5 days a week  Work with your healthcare provider to develop an exercise plan that you enjoy  · Get enough sleep  Create a routine to help you relax before bed  Try to go to bed and wake up at the same time every day  Sleep is important for emotional health  · Eat a variety of healthy foods  Healthy foods include fruits, vegetables, whole-grain breads, low-fat dairy products, beans, lean meats, and fish  A healthy meal plan is low in fat, salt, and added sugar  · Avoid or limit alcohol  Ask your healthcare provider how much alcohol is safe for you to drink  A drink of alcohol is 12 ounces of beer, 5 ounces of wine, or 1½ ounces of liquor  Follow up with your healthcare provider as directed: You will need to return so your healthcare provider can monitor your progress  Write down your questions so you remember to ask them during your visits  CARE AGREEMENT:   You have the right to help plan your care  Learn about your health condition and how it may be treated  Discuss treatment options with your caregivers to decide what care you want to receive  You always have the right to refuse treatment  The above information is an  only  It is not intended as medical advice for individual conditions or treatments  Talk to your doctor, nurse or pharmacist before following any medical regimen to see if it is safe and effective for you  © 2014 [address] is for End User's use only and may not be sold, redistributed or otherwise used for commercial purposes  All illustrations and images included in CareNotes® are the copyrighted property of [employer]  or [employer]  Depression   WHAT YOU NEED TO KNOW:   Depression is a medical condition that causes feelings of sadness or hopelessness that do not go away  Depression may cause you to lose interest in things you used to enjoy  These feelings may interfere with your daily life     DISCHARGE INSTRUCTIONS:   Call 911 for any of the following:   · You think about harming yourself or someone else  Contact your healthcare provider if:   · Your symptoms do not improve  · You cannot make it to your next appointment  · You have new symptoms  · You have questions or concerns about your condition or care  Medicines:   · Antidepressants  may be given to improve or balance your mood  You may need to take this medicine for several weeks before you begin to feel better  · Take your medicine as directed  Contact your healthcare provider if you think your medicine is not helping or if you have side effects  Tell him of her if you are allergic to any medicine  Keep a list of the medicines, vitamins, and herbs you take  Include the amounts, and when and why you take them  Bring the list or the pill bottles to follow-up visits  Carry your medicine list with you in case of an emergency  Therapy  may be used to treat your depression  A therapist will help you learn to cope with your thoughts and feelings  This can be done alone or in a group  It may also be done with family members or a significant other  Self-care:   · Get regular physical activity  Try to exercise for 30 minutes, 3 to 5 days a week  Work with your healthcare provider to develop an exercise plan that you enjoy  Physical activity may improve your symptoms  · Get enough sleep  Create a routine to help you relax before bed  You can listen to music, read, or do yoga  Try to go to bed and wake up at the same time every day  Sleep is important for emotional health  · Eat a variety of healthy foods from all of the food groups  A healthy meal plan is low in fat, salt, and added sugar  Ask your healthcare provider for more information about a meal plan that is right for you  · Do not drink alcohol or use drugs  Alcohol and drugs can make your symptoms worse  Follow up with your healthcare provider as directed:   Your healthcare provider will monitor your progress at follow-up visits  He or she will also monitor your medicine if you take antidepressants  Your healthcare provider will ask if the medicine is helping  Tell him or her about any side effects or problems you may have with your medicine  The type or amount of medicine may need to be changed  Write down your questions so you remember to ask them during your visits  © 2017 [facility] Information is for End User's use only and may not be sold, redistributed or otherwise used for commercial purposes  All illustrations and images included in CareNotes® are the copyrighted property of [employer]  or [employer]  The above information is an  only  It is not intended as medical advice for individual conditions or treatments  Talk to your doctor, nurse or pharmacist before following any medical regimen to see if it is safe and effective for you  Depression   WHAT YOU NEED TO KNOW:   What is depression? Depression is a medical condition that causes feelings of sadness or hopelessness that do not go away  Depression may cause you to lose interest in things you used to enjoy  These feelings may interfere with your daily life  What causes or increases my risk for depression? Depression may be caused by changes in brain chemicals that affect your mood  Your risk for depression may be higher if you have any of the following:  · Stressful events such as the death of a loved one, unemployment, childhood trauma, divorce, or domestic abuse    · A chronic medical condition such as diabetes, heart disease, or cancer    · Parents, siblings, or other family members with a history of depression    · Drug or alcohol abuse  What are the signs and symptoms of depression?    · Appetite changes, or weight gain or loss    · Trouble going to sleep or staying asleep, or sleeping too much    · Fatigue or lack of energy    · Feeling restless, irritable, or withdrawn    · Feeling worthless, hopeless, discouraged, or guilty    · Trouble concentrating, remembering things, doing daily tasks, or making decisions    · Thoughts about hurting or killing yourself  How is depression diagnosed? Your healthcare provider will ask about your symptoms and how long you have had them  He or she will ask if you have any family members with depression  Tell your healthcare provider about any stressful events in your life  He or she may ask about any other health conditions or medicines you take  How is depression treated? · Therapy  may be used to treat your depression  A therapist will help you learn to cope with your thoughts and feelings  This can be done alone or in a group  It may also be done with family members or a significant other  · Antidepressant medicine  may be given to improve or balance your mood  You may need to take this medicine for several weeks before you begin to feel better  Tell your healthcare provider about any side effects or problems you have with your medicine  The type or amount of medicine may need to be changed  How can I manage depression? · Get regular physical activity  Try to exercise for 30 minutes, 3 to 5 days a week  Work with your healthcare provider to develop an exercise plan that you enjoy  Physical activity may improve your symptoms  · Get enough sleep  Create a routine to help you relax before bed  You can listen to music, read, or do yoga  Try to go to bed and wake up at the same time every day  Sleep is important for emotional health  · Eat a variety of healthy foods from all of the food groups  A healthy meal plan is low in fat, salt, and added sugar  Ask your healthcare provider for more information about a meal plan that is right for you  · Do not drink alcohol or use drugs  Alcohol and drugs can make your symptoms worse    Call 911 for any of the following:   · You think about harming yourself or someone else     When should I contact my healthcare provider? · Your symptoms do not improve  · You cannot make it to your next appointment  · You have new symptoms  · You have questions or concerns about your condition or care  CARE AGREEMENT:   You have the right to help plan your care  Learn about your health condition and how it may be treated  Discuss treatment options with your caregivers to decide what care you want to receive  You always have the right to refuse treatment  The above information is an  only  It is not intended as medical advice for individual conditions or treatments  Talk to your doctor, nurse or pharmacist before following any medical regimen to see if it is safe and effective for you  © 2017 [address] Information is for End User's use only and may not be sold, redistributed or otherwise used for commercial purposes  All illustrations and images included in CareNotes® are the copyrighted property of [employer]  or [employer]  Depressive Disorder in Adolescents   WHAT YOU NEED TO KNOW:   A depressive disorder is a medical condition that causes feelings of sadness or hopelessness that do not go away  These feelings last longer than usual It is more than feeling down in the dumps  Depressive disorders cause you to lose interest in things and sometimes the people you used to enjoy  These feelings interfere with your daily life  Do not wait for your feelings to go away  A depressive disorder can be treated  Treatment can help you feel better  DISCHARGE INSTRUCTIONS:   Call 911 if:   · You feel like you could harm yourself or someone else  You or someone close to you should contact your healthcare provider if:   · Your symptoms do not improve  · You cannot make it to your next appointment  · You have new symptoms  · You have questions or concerns about your condition or care  Talk to your parent or an adult:   If you feel like you cannot talk to your parents, talk to a school nurse or counselor  Tell someone about your feelings and thoughts  Tell him or her if you feel like you might harm yourself  Tell him or her if you are being bullied by someone  Talk with your friends: Your friends can listen and understand how you feel  Your friends can support you  Contact a crisis hotline: There are many crisis hotlines with someone available to help you 24 hours a day, 7 days a week  · Call the crisis hotline at 3-603.415.2081  Text "GO" to 886541 if you are not comfortable talking to someone you know  · Find the number for a crisis hotline in your area and keep it with you at all times  Things that may help improve your mood:   · Eat healthy foods  · Do physical activity every day, such as walking or running  · Get out in the daylight  · Get enough sleep  · Focus on positive things, even the small things  · Keep a journal     · Use music or art to be creative  · Raina Hamburger out with positive people  · Do not use alcohol or drugs  Follow up with you healthcare provider as directed: Take your journal  Write down your questions so you remember to ask them during your visits  © 2017 [address] Information is for End User's use only and may not be sold, redistributed or otherwise used for commercial purposes  All illustrations and images included in CareNotes® are the copyrighted property of [employer]  or [employer]  The above information is an  only  It is not intended as medical advice for individual conditions or treatments  Talk to your doctor, nurse or pharmacist before following any medical regimen to see if it is safe and effective for you  Hypertension   WHAT YOU NEED TO KNOW:   Hypertension is high blood pressure (BP)  Your BP is the force of your blood moving against the walls of your arteries  Normal BP is less than 120/80   Prehypertension is between 120/80 and 139/89  Hypertension is 140/90 or higher  Hypertension causes your BP to get so high that your heart has to work much harder than normal  This can damage your heart  You can control hypertension with a healthy lifestyle or medicines  A controlled blood pressure helps protect your organs, such as your heart, lungs, brain, and kidneys  DISCHARGE INSTRUCTIONS:   Call 911 for any of the following:   · You have discomfort in your chest that feels like squeezing, pressure, fullness, or pain  · You become confused or have difficulty speaking  · You suddenly feel lightheaded or have trouble breathing  · You have pain or discomfort in your back, neck, jaw, stomach, or arm  Seek care immediately if:   · You have a severe headache or vision loss  · You have weakness in an arm or leg  Contact your healthcare provider if:   · You feel faint, dizzy, confused, or drowsy  · You have been taking your BP medicine and your BP is still higher than your healthcare provider says it should be  · You have questions or concerns about your condition or care  Medicines: You may  need any of the following:  · Medicine  may be used to help lower your BP  You may need more than one type of medicine  Take the medicine exactly as directed  · Diuretics  help decrease extra fluid that collects in your body  This will help lower your BP  You may urinate more often while you take this medicine  · Cholesterol medicine  helps lower your cholesterol level  A low cholesterol level helps prevent heart disease and makes it easier to control your blood pressure  · Take your medicine as directed  Contact your healthcare provider if you think your medicine is not helping or if you have side effects  Tell him or her if you are allergic to any medicine  Keep a list of the medicines, vitamins, and herbs you take  Include the amounts, and when and why you take them   Bring the list or the pill bottles to follow-up visits  Carry your medicine list with you in case of an emergency  Follow up with your healthcare provider as directed: You will need to return to have your BP checked and to have other lab tests done  Write down your questions so you remember to ask them during your visits  Manage hypertension:  Talk with your healthcare provider about these and other ways to manage hypertension:  · Check your BP at home  Sit and rest for 5 minutes before you take your BP  Extend your arm and support it on a flat surface  Your arm should be at the same level as your heart  Follow the directions that came with your BP monitor  If possible, take at least 2 BP readings each time  Take your BP at least twice a day at the same times each day, such as morning and evening  Keep a record of your BP readings and bring it to your follow-up visits  Ask your healthcare provider what your BP should be  · Limit sodium (salt) as directed  Too much sodium can affect your fluid balance  Check labels to find low-sodium or no-salt-added foods  Some low-sodium foods use potassium salts for flavor  Too much potassium can also cause health problems  Your healthcare provider will tell you how much sodium and potassium are safe for you to have in a day  He or she may recommend that you limit sodium to 2,300 mg a day  · Follow the meal plan recommended by your healthcare provider  A dietitian or your provider can give you more information on low-sodium plans or the DASH (Dietary Approaches to Stop Hypertension) eating plan  The DASH plan is low in sodium, unhealthy fats, and total fat  It is high in potassium, calcium, and fiber  · Exercise to maintain a healthy weight  Exercise at least 30 minutes per day, on most days of the week  This will help decrease your blood pressure  Ask your healthcare provider about the best exercise plan for you  · Decrease stress  This may help lower your BP   Learn ways to relax, such as deep breathing or listening to music  · Limit alcohol  Women should limit alcohol to 1 drink a day  Men should limit alcohol to 2 drinks a day  A drink of alcohol is 12 ounces of beer, 5 ounces of wine, or 1½ ounces of liquor  · Do not smoke  Nicotine and other chemicals in cigarettes and cigars can increase your BP and also cause lung damage  Ask your healthcare provider for information if you currently smoke and need help to quit  E-cigarettes or smokeless tobacco still contain nicotine  Talk to your healthcare provider before you use these products  · Manage any other health conditions you have  Health conditions such as diabetes can increase your risk for hypertension  Follow your healthcare provider's instructions and take all your medicines as directed  © 2017 [address] Information is for End User's use only and may not be sold, redistributed or otherwise used for commercial purposes  All illustrations and images included in CareNotes® are the copyrighted property of [employer]  or [employer]  The above information is an  only  It is not intended as medical advice for individual conditions or treatments  Talk to your doctor, nurse or pharmacist before following any medical regimen to see if it is safe and effective for you  Hypertension   AMBULATORY CARE:   Hypertension  is high blood pressure (BP)  Your BP is the force of your blood moving against the walls of your arteries  Normal BP is less than 120/80  Prehypertension is between 120/80 and 139/89  Hypertension is 140/90 or higher  Hypertension causes your BP to get so high that your heart has to work much harder than normal  This can damage your heart  You can control hypertension with a healthy lifestyle or medicines  A controlled blood pressure helps protect your organs, such as your heart, lungs, brain, and kidneys    Common symptoms include the following:   · Headache · Blurred vision     · Chest pain     · Dizziness or weakness     · Trouble breathing    · Nosebleeds  Call 911 for any of the following:   · You have discomfort in your chest that feels like squeezing, pressure, fullness, or pain  · You become confused or have difficulty speaking  · You suddenly feel lightheaded or have trouble breathing  · You have pain or discomfort in your back, neck, jaw, stomach, or arm  Seek care immediately if:   · You have a severe headache or vision loss  · You have weakness in an arm or leg  Contact your healthcare provider if:   · You feel faint, dizzy, confused, or drowsy  · You have been taking your BP medicine and your BP is still higher than your healthcare provider says it should be  · You have questions or concerns about your condition or care  Treatment for hypertension  may include medicine to lower your BP and lower your cholesterol level  A low cholesterol level helps prevent heart disease and makes it easier to control your blood pressure  You may also need to make lifestyle changes  Take your medicine exactly as directed  Manage hypertension:  Talk with your healthcare provider about these and other ways to manage hypertension:  · Check your BP at home  Sit and rest for 5 minutes before you take your BP  Extend your arm and support it on a flat surface  Your arm should be at the same level as your heart  Follow the directions that came with your BP monitor  If possible, take at least 2 BP readings each time  Take your BP at least twice a day at the same times each day, such as morning and evening  Keep a record of your BP readings and bring it to your follow-up visits  Ask your healthcare provider what your BP should be  · Limit sodium (salt) as directed  Too much sodium can affect your fluid balance  Check labels to find low-sodium or no-salt-added foods  Some low-sodium foods use potassium salts for flavor   Too much potassium can also cause health problems  Your healthcare provider will tell you how much sodium and potassium are safe for you to have in a day  He or she may recommend that you limit sodium to 2,300 mg a day  · Follow the meal plan recommended by your healthcare provider  A dietitian or your provider can give you more information on low-sodium plans or the DASH (Dietary Approaches to Stop Hypertension) eating plan  The DASH plan is low in sodium, unhealthy fats, and total fat  It is high in potassium, calcium, and fiber  · Exercise to maintain a healthy weight  Exercise at least 30 minutes per day, on most days of the week  This will help decrease your blood pressure  Ask your healthcare provider about the best exercise plan for you  · Decrease stress  This may help lower your BP  Learn ways to relax, such as deep breathing or listening to music  · Limit alcohol  Women should limit alcohol to 1 drink a day  Men should limit alcohol to 2 drinks a day  A drink of alcohol is 12 ounces of beer, 5 ounces of wine, or 1½ ounces of liquor  · Do not smoke  Nicotine and other chemicals in cigarettes and cigars can increase your BP and also cause lung damage  Ask your healthcare provider for information if you currently smoke and need help to quit  E-cigarettes or smokeless tobacco still contain nicotine  Talk to your healthcare provider before you use these products  · Manage any other health conditions you have  Health conditions such as diabetes can increase your risk for hypertension  Follow your healthcare provider's instructions and take all your medicines as directed  Follow up with your healthcare provider as directed: You will need to return to have your BP checked and to have other lab tests done  Write down your questions so you remember to ask them during your visits    © 2017 [address] Information is for End User's use only and may not be sold, redistributed or otherwise used for commercial purposes  All illustrations and images included in CareNotes® are the copyrighted property of [employer] [facility] [facility]  or [facility]   The above information is an  only  It is not intended as medical advice for individual conditions or treatments  Talk to your doctor, nurse or pharmacist before following any medical regimen to see if it is safe and effective for you  Hypertension   WHAT YOU NEED TO KNOW:   Hypertension is high blood pressure (BP)  Your BP is the force of your blood moving against the walls of your arteries  Normal BP is less than 120/80  Prehypertension is between 120/80 and 139/89  Hypertension is 140/90 or higher  Hypertension causes your BP to get so high that your heart has to work much harder than normal  This can damage your heart  You can control hypertension with a healthy lifestyle or medicines  A controlled blood pressure helps protect your organs, such as your heart, lungs, brain, and kidneys  DISCHARGE INSTRUCTIONS:   Call 911 for any of the following:   · You have discomfort in your chest that feels like squeezing, pressure, fullness, or pain  · You become confused or have difficulty speaking  · You suddenly feel lightheaded or have trouble breathing  · You have pain or discomfort in your back, neck, jaw, stomach, or arm  Return to the emergency department if:   · You have a severe headache or vision loss  · You have weakness in an arm or leg  Contact your healthcare provider if:   · You feel faint, dizzy, confused, or drowsy  · You have been taking your BP medicine and your BP is still higher than your healthcare provider says it should be  · You have questions or concerns about your condition or care  Medicines: You may  need any of the following:  · Medicine  may be used to help lower your BP  You may need more than one type of medicine  Take the medicine exactly as directed       · Diuretics  help decrease extra fluid that collects in your body  This will help lower your BP  You may urinate more often while you take this medicine  · Cholesterol medicine  helps lower your cholesterol level  A low cholesterol level helps prevent heart disease and makes it easier to control your blood pressure  · Take your medicine as directed  Contact your healthcare provider if you think your medicine is not helping or if you have side effects  Tell him or her if you are allergic to any medicine  Keep a list of the medicines, vitamins, and herbs you take  Include the amounts, and when and why you take them  Bring the list or the pill bottles to follow-up visits  Carry your medicine list with you in case of an emergency  Follow up with your healthcare provider as directed: You will need to return to have your BP checked and to have other lab tests done  Write down your questions so you remember to ask them during your visits  Manage hypertension:  Talk with your healthcare provider about these and other ways to manage hypertension:  · Check your BP at home  Sit and rest for 5 minutes before you take your BP  Extend your arm and support it on a flat surface  Your arm should be at the same level as your heart  Follow the directions that came with your BP monitor  If possible, take at least 2 BP readings each time  Take your BP at least twice a day at the same times each day, such as morning and evening  Keep a record of your BP readings and bring it to your follow-up visits  Ask your healthcare provider what your BP should be  · Limit sodium (salt) as directed  Too much sodium can affect your fluid balance  Check labels to find low-sodium or no-salt-added foods  Some low-sodium foods use potassium salts for flavor  Too much potassium can also cause health problems  Your healthcare provider will tell you how much sodium and potassium are safe for you to have in a day   He or she may recommend that you limit sodium to 2,300 mg a day            · Follow the meal plan recommended by your healthcare provider  A dietitian or your provider can give you more information on low-sodium plans or the DASH (Dietary Approaches to Stop Hypertension) eating plan  The DASH plan is low in sodium, unhealthy fats, and total fat  It is high in potassium, calcium, and fiber  · Exercise to maintain a healthy weight  Exercise at least 30 minutes per day, on most days of the week  This will help decrease your blood pressure  Ask your healthcare provider about the best exercise plan for you  · Decrease stress  This may help lower your BP  Learn ways to relax, such as deep breathing or listening to music  · Limit alcohol  Women should limit alcohol to 1 drink a day  Men should limit alcohol to 2 drinks a day  A drink of alcohol is 12 ounces of beer, 5 ounces of wine, or 1½ ounces of liquor  · Do not smoke  Nicotine and other chemicals in cigarettes and cigars can increase your BP and also cause lung damage  Ask your healthcare provider for information if you currently smoke and need help to quit  E-cigarettes or smokeless tobacco still contain nicotine  Talk to your healthcare provider before you use these products  · Manage any other health conditions you have  Health conditions such as diabetes can increase your risk for hypertension  Follow your healthcare provider's instructions and take all your medicines as directed  © 2017 [address]  Information is for End User's use only and may not be sold, redistributed or otherwise used for commercial purposes  All illustrations and images included in CareNotes® are the copyrighted property of [employer]  or [employer]  The above information is an  only  It is not intended as medical advice for individual conditions or treatments   Talk to your doctor, nurse or pharmacist before following any medical regimen to see if it is safe and effective for you  Hypertensive Crisis   WHAT YOU NEED TO KNOW:   What is a hypertensive crisis? A hypertensive crisis is a sudden spike in blood pressure to 180/120 or higher  It is also known as acute hypertension  A hypertensive crisis is a medical emergency  It could lead to organ damage or be life-threatening  What increases my risk for a hypertensive crisis? · Failure to take your blood pressure medicine as directed by your doctor  · Hypertension caused by pregnancy (preeclampsia, eclampsia)     · Thyroid disease, kidney or adrenal disease, heart disease, or stroke     · Cocaine or amphetamine use     · Cigarette smoking or alcohol abuse    · Burns, head injuries, surgery, or trauma  What are the signs and symptoms of a hypertensive crisis? · Blurred vision or headache     · Nausea or vomiting     · Shortness of breath or chest pain     · Dizziness or weakness     · Problems with thinking or behavior changes, such as sleepiness, forgetfulness, or confusion  How is a hypertensive crisis diagnosed? Your healthcare provider will ask if you have health conditions, such as high blood pressure, diabetes, or heart disease  He will ask what your symptoms are and if you have ever had surgery  Tell him what medicines you take and if you have ever used illegal drugs  Your healthcare provider will check your blood pressure on both arms and listen to your heart and lungs  He will examine your eyes closely and also test your strength, balance, reflexes, and memory  The following tests may be done to check for damage to your heart, brain, and kidneys:  · Blood tests  are done to find out if your liver and kidneys are functioning properly  · Urine tests  are done to check your kidneys  · An EKG  is used to monitor your heart  Sticky pads placed on your skin record your heart's electrical activity  · A chest x-ray  is a picture of your lungs and heart   A chest x-ray may show signs of heart failure or fluid around your heart and lungs  · A CT scan  is also called a CAT scan  An x-ray machine uses a computer to take pictures of your head  The pictures may show signs of a stroke  You may be given dye before the pictures are taken to help healthcare providers see the pictures better  Tell the healthcare provider if you have ever had an allergic reaction to contrast dye  How is a hypertensive crisis treated? Treatment depends on the cause of your hypertensive crisis  Healthcare providers will lower your blood pressure and try to prevent organ damage  You may need the following:  · Blood pressure medicine  is given to lower your blood pressure  There are many different types of blood pressure medicine, and you may need more than one type  · Diuretics  help decrease extra fluid that collects in your blood vessels  This lowers your blood pressure by reducing pressure in your arteries  Diuretics are often called water pills  You may urinate more often while you take this medicine  What are the risks of a hypertensive crisis? Even with treatment, you are at risk for a heart attack, stroke, or kidney damage  You could develop a bulge or tear in the wall of your aorta (the artery that supplies blood throughout your body)  Fluid could collect in your lungs and make it hard for you to breathe  You are at risk for blindness, eye damage, seizures, as well as brain damage  How can I help prevent another hypertensive crisis? · Manage other health conditions  such as diabetes, thyroid disease, or adrenal problems  These conditions can cause or worsen a hypertensive crisis  · Take your blood pressure at home  while in a seated position  Take it at least twice a day, such as morning and evening  Keep a log of your blood pressure readings and bring it to your follow-up visits  · Eat a variety of healthy foods  including fruits, vegetables, whole-grain breads, low-fat dairy products, beans, lean meats, and fish   You will need to limit how much sodium and fat you eat  You also may need to eat more potassium  Ask if you need to be on a special diet  Changes to your diet will help lower your blood pressure  · Ask your healthcare provider if you are at a healthy weight  Ask him to help you create a weight loss plan if you are overweight  Even a little weight loss can help lower your blood pressure  · Ask your healthcare provider about the best exercise plan for you  Exercise may help lower your blood pressure  · Limit alcohol  to 1 drink a day if you are a woman  A man should limit alcohol to 2 drinks a day  A drink of alcohol is 12 ounces of beer, 5 ounces of wine, or 1½ ounces of liquor  · Do not smoke  Smoking causes heart disease and may also raise your blood pressure  If you smoke, it is never too late to quit  Ask your healthcare provider for information if you need help to quit smoking  Where can I find support and more information? · [address]  [place] , [place]   Phone: [phone]  Web Address: https://[employer]/  org   When should I contact my healthcare provider? · You run out of medicine  · You have questions or concerns about your condition or care  When should I seek immediate care or call 911? · You take your blood pressure and it is 180/110 or higher  · You have a severe headache  · You have chest pain or shortness of breath  · You have weakness or numbness in your face, arms, or legs  · You cannot see or talk as well as usual   CARE AGREEMENT:   You have the right to help plan your care  Learn about your health condition and how it may be treated  Discuss treatment options with your caregivers to decide what care you want to receive  You always have the right to refuse treatment  The above information is an  only  It is not intended as medical advice for individual conditions or treatments   Talk to your doctor, nurse or pharmacist before following any medical regimen to see if it is safe and effective for you  © 2017 [address] Information is for End User's use only and may not be sold, redistributed or otherwise used for commercial purposes  All illustrations and images included in CareNotes® are the copyrighted property of A [employer] M , Inc  or [employer]  Hypertensive Crisis   WHAT YOU NEED TO KNOW:   A hypertensive crisis is a sudden spike in blood pressure to 180/120 or higher  It is also known as acute hypertension  A hypertensive crisis is a medical emergency  It could lead to organ damage or be life-threatening  DISCHARGE INSTRUCTIONS:   Medicines: The following medicines may be ordered for you:  · Blood pressure medicine  is given to lower your blood pressure  There are many different types of blood pressure medicine, and you may need more than one type  · Diuretics  help decrease extra fluid that collects in your blood vessels  This lowers your blood pressure by reducing pressure in your arteries  Diuretics are often called water pills  You may urinate more often while you take this medicine  · Take your medicine as directed  Contact your healthcare provider if you think your medicine is not helping or if you have side effects  Tell him or her if you are allergic to any medicine  Keep a list of the medicines, vitamins, and herbs you take  Include the amounts, and when and why you take them  Bring the list or the pill bottles to follow-up visits  Carry your medicine list with you in case of an emergency  Follow up with your healthcare provider within 1 week or as directed: You will need to return to have your blood pressure checked and other tests  Your healthcare provider may also refer to you a cardiologist  Write down your questions so you remember to ask them during your visits    Monitor your blood pressure at home:  Take your blood pressure while in a seated position  Take it at least twice a day, such as morning and evening  Keep a log of your blood pressure readings and bring it to your follow-up visits  Help prevent another hypertensive crisis:   · Manage other health conditions  such as diabetes, thyroid disease, or adrenal problems  These conditions can cause or worsen a hypertensive crisis  · Eat a variety of healthy foods  including fruits, vegetables, whole-grain breads, low-fat dairy products, beans, lean meats, and fish  You will need to limit how much sodium and fat you eat  You also may need to eat more potassium  Ask if you need to be on a special diet  Changes to your diet will help lower your blood pressure  · Ask if you are at a healthy weight  Ask your healthcare provider to help you create a weight loss plan if you are overweight  Even a little weight loss can help lower your blood pressure  · Ask your healthcare provider about the best exercise plan for you  Exercise may help lower your blood pressure  · Limit alcohol  to 1 drink a day if you are a woman  A man should limit alcohol to 2 drinks a day  A drink of alcohol is 12 ounces of beer, 5 ounces of wine, or 1½ ounces of liquor  · Do not smoke  Smoking causes heart disease and may also raise your blood pressure  If you smoke, it is never too late to quit  Ask your healthcare provider for information if you need help to quit smoking  For more information:   · [address]   [place] , [place]   Phone: [phone]  Web Address: [email or web address]  org   Contact your healthcare provider if:   · You run out of medicine  · You have questions or concerns about your condition or care  Seek care immediately or call 911 if:   · You take your blood pressure and it is 180/110 or higher  · You have a severe headache  · You have chest pain or shortness of breath  · You have weakness or numbness in your face, arms, or legs  · You cannot see or talk as well as usual   © 2017 [address] is for End User's use only and may not be sold, redistributed or otherwise used for commercial purposes  All illustrations and images included in CareNotes® are the copyrighted property of [employer] M [employer]  or [employer]  The above information is an  only  It is not intended as medical advice for individual conditions or treatments  Talk to your doctor, nurse or pharmacist before following any medical regimen to see if it is safe and effective for you

## 2017-12-03 NOTE — CASE MANAGEMENT
[facility] in the [facility] by [employer] for 2017  Network Utilization Review Department  Phone: [phone]; Fax [phone]  ATTENTION: The Network Utilization Review Department is now centralized for our 7 Facilities  Make a note that we have a new phone and fax numbers for our Department  Please call with any questions or concerns to [phone] and carefully follow the prompts so that you are directed to the right person  All voicemails are confidential  Fax any determinations, approvals, denials, and requests for initial or continue stay review clinical to [phone]  Due to HIGH CALL volume, it would be easier if you could please send faxed requests to expedite your requests and in part, help us provide discharge notifications faster  Initial Clinical Review    Admission: Date/Time/Statement: [email or web address]    Orders Placed This Encounter   Procedures    Place in Observation (expected length of stay for this patient is less than two midnights)     Standing Status:   Standing     Number of Occurrences:   1     Order Specific Question:   Admitting Physician     Answer:   Akash Castillo     Order Specific Question:   Level of Care     Answer:   Med Surg [16]         ED: Date/Time/Mode of Arrival:   ED Arrival Information     Expected Arrival Acuity Means of Arrival Escorted By Service Admission Type    - 12/2/2017 10:13 Urgent Walk-In Family Member General Medicine Urgent    Arrival Complaint    R ARM NUMBNESS/TINGLING          Chief Complaint:   Chief Complaint   Patient presents with    Arm Pain     right arm numbness; started about 1 hour PTA  History of Illness: Ngoc Kirby is a 56 y o  female who presents with uncontrolled hypertension and pain and tingling sensation in the right arm which lasted for 1 hour  Patient is noncompliant with home medications    Patient says she takes metoprolol 50 mg p o  daily and her primary care physician stopped lisinopril 10 mg p  o  daily last week  Patient did agree that she is non complaint with her medications  Patient denies headache, blurring of vision, diplopia, chest pain, palpitation, nausea, vomiting, diarrhea, abdominal pain  [facility] ED Vital Signs:   ED Triage Vitals   Temperature Pulse Respirations Blood Pressure SpO2   12/02/17 1022 12/02/17 1030 12/02/17 1022 12/02/17 1022 12/02/17 1022   97 6 °F (36 4 °C) 66 17 141/97 99 %      Temp Source Heart Rate Source Patient Position - Orthostatic VS BP Location FiO2 (%)   12/02/17 1022 12/02/17 1022 12/02/17 1022 12/02/17 1022 --   Temporal Monitor Lying Left arm       Pain Score       12/02/17 1022       4        Wt Readings from Last 1 Encounters:   12/03/17 43 7 kg (96 lb 6 4 oz)       Vital Signs (abnormal):   12/02/17 1111  --  --  --   183/106  --  --  --   12/02/17 1101  --  62   27   210/119  98 %  None (Room air)  Standing   12/02/17 1100  --  65  17   189/101  100 %  --  --   12/02/17 1059  --  63   34   204/117  99 %  None (Room air)  Sitting   12/02/17 1057  --  62  22   189/101  99 %  None (Room air)  Lying   12/02/17 1030  --  66   33   197/100  100 %  --  --   12/02/17 1022  97 6 °F (36 4 °C)  --  17  141/97  99 %  --           Abnormal Labs/Diagnostic Test Results: Ct Head Without Contrast     Result Date: 12/2/2017  Narrative: CT BRAIN - WITHOUT CONTRAST INDICATION:      Impression: No acute intracranial abnormality  Workstation performed: [id number]         EKG, Pathology, and Other Studies Reviewed on Admission:   EKG:  No ST T wave changes noted    ED Treatment:   Medication Administration from 12/02/2017 1013 to 12/02/2017 1305       Date/Time Order Dose Route Action Action by Comments     12/02/2017 1111 hydrALAZINE (APRESOLINE) injection 5 mg 5 mg Intravenous Given Doneta Ganser, RN           Past Medical/Surgical History:    Active Ambulatory Problems     Diagnosis Date Noted    No Active Ambulatory Problems Resolved Ambulatory Problems     Diagnosis Date Noted    No Resolved Ambulatory Problems     Past Medical History:   Diagnosis Date    Hypertension        Admitting Diagnosis: Arm pain [M79 603]  Accelerated hypertension [I10]  Right upper extremity numbness [R20 2]    Age/Sex: 56 y o  female    Assessment/Plan:      Principal Problem:    Hypertension, accelerated  Active Problems:    Medically noncompliant    Depression        Plan for the Primary Problem(s):  · Uncontrolled hypertension  ? Admit to telemetry  ? Vitals as per floor protocol  ? Cardiac diet, low salt diet  ? Ambulate as tolerated  ? Labs:  CBC, BMP, LFTs, INR, PTT  ? Resume home medications  ? IV hydralazine 10 mg q 6 hours as needed for systolic blood pressure greater than 180  ? Start patient on Norvasc 5 mg daily  ? DVT prophylaxis:  Lovenox 40 mg subcu daily     Plan for Additional Problems:   · Medically noncompliant with home medication  · Depression     VTE Prophylaxis: Enoxaparin (Lovenox)  / sequential compression device   Code Status:  Full code  POLST: POLST is not applicable to this patient     Anticipated Length of Stay:  Patient will be admitted on an Observation basis with an anticipated length of stay of   2 midnights     Justification for Hospital Stay:  Uncontrolled hypertension     Admission Orders:  OBS [email or web address]  DAIY WEIGHTS  OOB  TELE  PT/OT  SEQ COMP DEVICE  CARIDAC DIET      Scheduled Meds:   amLODIPine 10 mg Oral Daily   docusate sodium 100 mg Oral BID   enoxaparin 40 mg Subcutaneous Daily   FLUoxetine 20 mg Oral Daily   metoprolol tartrate 50 mg Oral Q12H [address]   polyethylene glycol 17 g Oral Daily     Continuous Infusions:    PRN Meds:   Acetaminophen X1    aluminum-magnesium hydroxide-simethicone    calcium carbonate    hydrALAZINE    ondansetron

## 2017-12-03 NOTE — PHYSICAL THERAPY NOTE
12/03/17 0902   Note Type   Note type Eval/Treat   Pain Assessment   Pain Score 3   Pain Location Head   Prior Function   Level of [place] Independent with ADLs and functional mobility   Lives With Family   Cognition   Orientation Level Oriented X4   RLE Assessment   RLE Assessment (strength 4+/5)   LLE Assessment   LLE Assessment (strength 4+/5)   Bed Mobility   Supine to Sit 7  Independent   Sit to Supine 7  Independent   Transfers   Sit to Stand 7  Independent   Stand to Sit 7  Independent   Ambulation/Elevation   Gait Assistance 6  Modified independent   Assistive Device None   Distance (250 feet)   Balance   Static Sitting Good   Dynamic Sitting Good   Static Standing Good   Dynamic Standing Good   Ambulatory Good   Activity Tolerance   Activity Tolerance Patient tolerated treatment well   Assessment   Prognosis Excellent   Problem List Decreased strength;Decreased endurance   Barriers to Discharge None   Goals   LTG Expiration Date 12/17/17   Long Term Goal #1 Pt will ambulate 500+ feet independent   Long Term Goal #2 Pt will demonstrate 5/5 BLE strength   Plan   Treatment/Interventions LE strengthening/ROM; Elevations; Therapeutic exercise; Endurance training;Gait training   PT Frequency (3-5 times weekly)   Recommendation   PT - OK to Discharge Yes

## 2017-12-03 NOTE — TREATMENT PLAN
Patient was asymptomatic in the morning  I discharge the patient today morning  An patient complained of numbness and tingling sensation of the right thumb patient lasted for 40 minutes and she is about to leave the hospital   I discontinued at discharge and ordered MRI of brain and consult Neurology for further input and management  Will check vitamin B12, folate and homocystine levels  TSH within normal limits    Will discharge the patient tomorrow

## 2017-12-04 ENCOUNTER — APPOINTMENT (INPATIENT)
Dept: MRI IMAGING | Facility: HOSPITAL | Age: 57
DRG: 305 | End: 2017-12-04
Payer: COMMERCIAL

## 2017-12-04 ENCOUNTER — APPOINTMENT (INPATIENT)
Dept: NON INVASIVE DIAGNOSTICS | Facility: HOSPITAL | Age: 57
DRG: 305 | End: 2017-12-04
Payer: COMMERCIAL

## 2017-12-04 VITALS
RESPIRATION RATE: 18 BRPM | BODY MASS INDEX: 17.42 KG/M2 | SYSTOLIC BLOOD PRESSURE: 145 MMHG | HEIGHT: 59 IN | DIASTOLIC BLOOD PRESSURE: 88 MMHG | WEIGHT: 86.42 LBS | HEART RATE: 69 BPM | TEMPERATURE: 98.2 F | OXYGEN SATURATION: 96 %

## 2017-12-04 LAB
CHOLEST SERPL-MCNC: 191 MG/DL (ref 50–200)
FOLATE SERPL-MCNC: >20 NG/ML (ref 3.1–17.5)
HDLC SERPL-MCNC: 44 MG/DL (ref 40–60)
LDLC SERPL CALC-MCNC: 98 MG/DL (ref 0–100)
TRIGL SERPL-MCNC: 247 MG/DL

## 2017-12-04 PROCEDURE — 82746 ASSAY OF FOLIC ACID SERUM: CPT | Performed by: INTERNAL MEDICINE

## 2017-12-04 PROCEDURE — 70544 MR ANGIOGRAPHY HEAD W/O DYE: CPT

## 2017-12-04 PROCEDURE — 80061 LIPID PANEL: CPT | Performed by: INTERNAL MEDICINE

## 2017-12-04 PROCEDURE — 70551 MRI BRAIN STEM W/O DYE: CPT

## 2017-12-04 PROCEDURE — 97116 GAIT TRAINING THERAPY: CPT

## 2017-12-04 PROCEDURE — 93306 TTE W/DOPPLER COMPLETE: CPT

## 2017-12-04 PROCEDURE — 70547 MR ANGIOGRAPHY NECK W/O DYE: CPT

## 2017-12-04 RX ORDER — ASPIRIN 81 MG/1
81 TABLET ORAL DAILY
Qty: 30 TABLET | Refills: 0 | Status: SHIPPED | OUTPATIENT
Start: 2017-12-05

## 2017-12-04 RX ADMIN — METOPROLOL TARTRATE 50 MG: 50 TABLET ORAL at 09:50

## 2017-12-04 RX ADMIN — FLUOXETINE 20 MG: 20 CAPSULE ORAL at 09:49

## 2017-12-04 RX ADMIN — ENOXAPARIN SODIUM 40 MG: 40 INJECTION SUBCUTANEOUS at 13:30

## 2017-12-04 RX ADMIN — AMLODIPINE BESYLATE 10 MG: 10 TABLET ORAL at 09:50

## 2017-12-04 RX ADMIN — ASPIRIN 81 MG: 81 TABLET, COATED ORAL at 09:50

## 2017-12-04 NOTE — CASE MANAGEMENT
Drake Kelly, RN Registered Nurse Signed   Case Management Date of Service: 12/3/2017  7:25 AM         St  [address] [facility] in the [facility] by Alexx Almodovar for 2017  Network Utilization Review Department  Phone: [phone]; Fax [phone]  ATTENTION: The Network Utilization Review Department is now centralized for our 7 Facilities  Make a note that we have a new phone and fax numbers for our Department  Please call with any questions or concerns to [phone] and carefully follow the prompts so that you are directed to the right person  All voicemails are confidential  Fax any determinations, approvals, denials, and requests for initial or continue stay review clinical to [phone]  Due to HIGH CALL volume, it would be easier if you could please send faxed requests to expedite your requests and in part, help us provide discharge notifications faster  Initial Clinical Review     Admission: Date/Time/Statement: [email or web address] -- changed to INPATIENT 12/3 @ 1334    12/03/17 1334  Inpatient Admission Once     Transfer Service: General Medicine       Question Answer Comment   Admitting Physician Kaila Feliciano    Level of Care Med Surg    Estimated length of stay More than 2 Midnights    Certification I certify that inpatient services are medically necessary for this patient for a duration of greater than two midnights  See H&P and MD Progress Notes for additional information about the patient's course of treatment          12/03/17 1333              Orders Placed This Encounter   Procedures    Place in Observation (expected length of stay for this patient is less than two midnights)       Standing Status:   Standing       Number of Occurrences:   1       Order Specific Question:   Admitting Physician       Answer:   Vega Stark       Order Specific Question:   Level of Care       Answer:   Med Surg [16]          ED: Date/Time/Mode of Arrival: ED Arrival Information      Expected Arrival Acuity Means of Arrival Escorted By Service Admission Type     - 12/2/2017 10:13 Urgent Walk-In Family Member General Medicine Urgent     Arrival Complaint     R ARM NUMBNESS/TINGLING             Chief Complaint:        Chief Complaint   Patient presents with    Arm Pain       right arm numbness; started about 1 hour PTA            History of Illness: Heydi Watkins a 56 y  o  female who presents with uncontrolled hypertension and pain and tingling sensation in the right arm which lasted for 1 hour   Patient is noncompliant with home medications   Patient says she takes metoprolol 50 mg p o  daily and her primary care physician stopped lisinopril 10 mg p  o  daily last week   Patient did agree that she is non complaint with her medications   Patient denies headache, blurring of vision, diplopia, chest pain, palpitation, nausea, vomiting, diarrhea, abdominal pain        ED Vital Signs:            ED Triage Vitals   Temperature Pulse Respirations Blood Pressure SpO2   12/02/17 1022 12/02/17 1030 12/02/17 1022 12/02/17 1022 12/02/17 1022   97 6 °F (36 4 °C) 66 17 141/97 99 %       Temp Source Heart Rate Source Patient Position - Orthostatic VS BP Location FiO2 (%)   12/02/17 1022 12/02/17 1022 12/02/17 1022 12/02/17 1022 --   Temporal Monitor Lying Left arm         Pain Score           12/02/17 1022           4            Wt Readings from Last 1 Encounters:   12/03/17 43 7 kg (96 lb 6 4 oz)         Vital Signs (abnormal):   12/02/17 1111   --   --   --    183/106   --   --   --   12/02/17 1101   --   62    27    210/119   98 %   None (Room air)   Standing   12/02/17 1100   --   65   17    189/101   100 %   --   --   12/02/17 1059   --   63    34    204/117   99 %   None (Room air)   Sitting   12/02/17 1057   --   62   22    189/101   99 %   None (Room air)   Lying   12/02/17 1030   --   66    33    197/100   100 %   --   --   12/02/17 1022   97 6 °F (36 4 °C)   --   17   141/97   99 %   --                Abnormal Labs/Diagnostic Test Results: Ct Head Without Contrast     Result Date: 12/2/2017  Narrative: CT BRAIN - WITHOUT CONTRAST INDICATION:      Impression: No acute intracranial abnormality  Workstation performed: [id number]         EKG, Pathology, and Other Studies Reviewed on Admission:   EKG:  No ST T wave changes noted     ED Treatment:              Medication Administration from 12/02/2017 1013 to 12/02/2017 1305        Date/Time Order Dose Route Action Action by Comments       12/02/2017 1111 hydrALAZINE (APRESOLINE) injection 5 mg 5 mg Intravenous Given Zachariah Ponce RN               Past Medical/Surgical History:        Past Medical History:   Diagnosis Date    Hypertension           Admitting Diagnosis: Arm pain [M79 603]  Accelerated hypertension [I10]  Right upper extremity numbness [R20 2]     Age/Sex: 56 y o  female     Assessment/Plan:      Principal Problem:    Hypertension, accelerated  Active Problems:    Medically noncompliant    Depression        Plan for the Primary Problem(s):  · Uncontrolled hypertension  ? Admit to telemetry  ? Vitals as per floor protocol  ? Cardiac diet, low salt diet  ? Ambulate as tolerated  ? Labs:  CBC, BMP, LFTs, INR, PTT  ? Resume home medications  ? IV hydralazine 10 mg q 6 hours as needed for systolic blood pressure greater than 180  ? Start patient on Norvasc 5 mg daily  ?  DVT prophylaxis:  Lovenox 40 mg subcu daily     Plan for Additional Problems:   · Medically noncompliant with home medication  · Depression     VTE Prophylaxis: Enoxaparin (Lovenox)  / sequential compression device   Code Status:  Full code  POLST: POLST is not applicable to this patient     Anticipated Length of Stay: Alisson Plasencia will be admitted on an Observation basis with an anticipated length of stay of   2 midnights    Justification for Hospital Stay:  Uncontrolled hypertension     Admission Orders:  [email or web address]  [facility] WEIGHTS  OOB  TELE  PT/OT  SEQ COMP DEVICE  CARIDAC DIET        Scheduled Meds:   amLODIPine 10 mg Oral Daily   docusate sodium 100 mg Oral BID   enoxaparin 40 mg Subcutaneous Daily   FLUoxetine 20 mg Oral Daily   metoprolol tartrate 50 mg Oral Q12H GALDINO   polyethylene glycol 17 g Oral Daily      Continuous Infusions:    PRN Meds:   Acetaminophen X1    aluminum-magnesium hydroxide-simethicone    calcium carbonate    hydrALAZINE    ondansetron              Pt was asymptomatic in AM -- written for d/c --- then had c/o numbness and tingling sensation of the right thumb patient lasted for 40 minutes when she was about to leave the hospital   D/C cx'd --- MRI brain ordered, Neurology consult ordered, labs ordered

## 2017-12-04 NOTE — PLAN OF CARE
DISCHARGE PLANNING - CARE MANAGEMENT     Discharge to post-acute care or home with appropriate resources Progressing        Nutrition/Hydration-ADULT     Nutrient/Hydration intake appropriate for improving, restoring or maintaining nutritional needs Progressing

## 2017-12-04 NOTE — PHYSICAL THERAPY NOTE
PT Treatment Note      12/04/17 1146   Pain Assessment   Pain Score No Pain     Bed Mobility   Supine to Sit 7  Independent   Sit to Supine 7  Independent   Transfers   Sit to Stand 7  Independent   Stand to Sit 7  Independent   Ambulation/Elevation   Gait Assistance 7  Independent   Assistive Device None   Distance 450'   Balance   Static Sitting Good   Dynamic Sitting Good   Static Standing Good   Dynamic Standing Good   Ambulatory Good   Activity Tolerance   Activity Tolerance Patient tolerated treatment well   Assessment   Prognosis Good   Problem List Decreased strength;Decreased endurance   Assessment performing functional mobility (I)  Would benefit from continued PT to imporve endurance and strength  Plan   Treatment/Interventions LE strengthening/ROM; Elevations;Gait training; Endurance training; Therapeutic exercise   Progress Progressing toward goals   Recommendation   Recommendation Home independently   Pt  OOB with call bell within reach and scd's on at end of PT session

## 2017-12-04 NOTE — SOCIAL WORK
Cm spoke with the patient and they are for d/c to home today  Cm is taking into account that the patient has preferences while planning the d/c needs  Cm plan was discussed with the patient and the patient is agreeable to the plan the patient does understand the importance of follow up care and taking the medications as prescribed  The patient is aware of any symptoms that he should look for when d/c   Has no needs on d/c

## 2017-12-04 NOTE — PLAN OF CARE
Problem: DISCHARGE PLANNING - CARE MANAGEMENT  Goal: Discharge to post-acute care or home with appropriate resources  INTERVENTIONS:  - Conduct assessment to determine patient/family and health care team treatment goals, and need for post-acute services based on payer coverage, community resources, and patient preferences, and barriers to discharge  - Address psychosocial, clinical, and financial barriers to discharge as identified in assessment in conjunction with the patient/family and health care team  - Arrange appropriate level of post-acute services according to patient's   needs and preference and payer coverage in collaboration with the physician and health care team  - Communicate with and update the patient/family, physician, and health care team regarding progress on the discharge plan  - Arrange appropriate transportation to post-acute venues   Outcome: Completed Date Met: 12/04/17  D/c home with no needs

## 2017-12-04 NOTE — DISCHARGE SUMMARY
Discharge Summary - [facility]    Patient Information: Esperanza Schaffer 56 y o  female MRN: [id number]  Unit/Bed#: 977-26 Encounter: [id number]    Discharging Physician / Practitioner: Olivia Farooq MD  PCP: Lottie Rhoades DO  Admission Date: 12/2/2017  Discharge Date: 12/04/17    Reason for Admission:  Uncontrolled hypertension    Discharge Diagnoses:     Principal Problem (Resolved): Hypertension, accelerated  Active Problems:    Medically noncompliant    Depression      Consultations During Hospital Stay:  · Neurology    Procedures Performed:     · CT head without contrast  · MRI brain  · MRA head and neck  · 2D echo    Significant Findings / Test Results:     · CT head without contrast:  No acute intracranial abnormality  · MRI brain:  Scattered mild chronic microangiopathic changes  No acute intracranial abnormality  · MRA brain:"  2 mm inferior directed outpouching from the supraclinoid ICA may represent a small aneurysm or infundibulum  ·   MRA carotids:  Unremarkable MR angiogram of the cervical vasculature  · 2D echo:  Systolic function was normal   Ejection fraction 70%  There were no regional wall motion abnormalities  Grade 1 diastolic dysfunction  Incidental Findings:   · As above     Test Results Pending at Discharge (will require follow up):    · None     Outpatient Tests Requested:  · None    Complications:  None    Hospital Course:     Esperanza Schaffer is a 56 y o  female patient who originally presented to the hospital on 12/2/2017 due to uncontrolled hypertension and pain and tingling sensation in the right arm which lasted for 1 hour   Patient is noncompliant with home medications   Patient says she takes metoprolol 50 mg p o  daily and her primary care physician stopped lisinopril 10 mg p  o  daily last week   Patient did agree that she is non complaint with her medications   Patient denies headache, blurring of vision, diplopia, chest pain, palpitation, nausea, vomiting, diarrhea, abdominal pain        Patient was admitted to telemetry under observation  Patient noncompliant with home medications  Metoprolol 50 mg p o  b i d  was initiated and Norvasc 10 mg p  o  daily was also started on the patient  Patient blood pressure came back to normal limits  Patient is asymptomatic  I stressed the importance of being compliant with the home medication to the patient  Advised the patient to follow up with the primary care physician  Patient agreed to the plan  At the time of discharge patient complained of numbness and tingling sensation in the right arm  Vitals were stable at the time  But we held her discharge and consulted Neurology  He recommended 2D echo, MRI brain, MRA brain and neck  Glean Gang came back normal and the patient is asymptomatic  Patient is discharge  Patient wanted to go home today    Condition at Discharge: good     Discharge Day Visit / Exam:     Subjective:  Patient seen examined at bedside  Patient denies fever, chills, nausea, vomiting, diarrhea, abdominal pain, chest pain, palpitation, shortness of breath  Patient wanted to go home today as all her investigations came back normal   Vitals: Blood Pressure: 145/88 (12/04/17 1530)  Pulse: 69 (12/04/17 1530)  Temperature: 98 2 °F (36 8 °C) (12/04/17 1530)  Temp Source: Temporal (12/04/17 1530)  Respirations: 18 (12/04/17 1530)  Height: 4' 11" (149 9 cm) (12/02/17 1300)  Weight - Scale: 39 2 kg (86 lb 6 7 oz) (12/04/17 1301)  SpO2: 96 % (12/04/17 1530)  Exam:   Physical Exam   Constitutional: She is oriented to person, place, and time  She appears well-developed and well-nourished  HENT:   Head: Normocephalic and atraumatic  Eyes: Conjunctivae and EOM are normal  Pupils are equal, round, and reactive to light  Neck: Normal range of motion  Neck supple  Cardiovascular: Normal rate, regular rhythm, normal heart sounds and intact distal pulses      Pulmonary/Chest: Effort normal and breath sounds normal    Abdominal: Soft  Bowel sounds are normal    Musculoskeletal: Normal range of motion  Neurological: She is alert and oriented to person, place, and time  She has normal reflexes  Skin: Skin is warm and dry  Vitals reviewed  Discharge instructions/Information to patient and family:   See after visit summary for information provided to patient and family  Provisions for Follow-Up Care:  See after visit summary for information related to follow-up care and any pertinent home health orders  Disposition:     Home    For Discharges to [facility] SN[facility]:   · Not Applicable to this Patient - Not Applicable to this Patient    Planned Readmission:  No     Discharge Statement:  I spent 60 minutes discharging the patient  This time was spent on the day of discharge  I had direct contact with the patient on the day of discharge  Greater than 50% of the total time was spent examining patient, answering all patient questions, arranging and discussing plan of care with patient as well as directly providing post-discharge instructions  Additional time then spent on discharge activities  Discharge Medications:  See after visit summary for reconciled discharge medications provided to patient and family        ** Please Note: This note has been constructed using a voice recognition system **

## 2017-12-05 LAB
ATRIAL RATE: 63 BPM
ATRIAL RATE: 65 BPM
P AXIS: -11 DEGREES
P AXIS: 6 DEGREES
PR INTERVAL: 110 MS
PR INTERVAL: 110 MS
QRS AXIS: 29 DEGREES
QRS AXIS: 62 DEGREES
QRSD INTERVAL: 68 MS
QRSD INTERVAL: 68 MS
QT INTERVAL: 410 MS
QT INTERVAL: 420 MS
QTC INTERVAL: 426 MS
QTC INTERVAL: 429 MS
T WAVE AXIS: 35 DEGREES
T WAVE AXIS: 48 DEGREES
VENTRICULAR RATE: 63 BPM
VENTRICULAR RATE: 65 BPM

## 2020-05-17 ENCOUNTER — OFFICE VISIT (OUTPATIENT)
Dept: URGENT CARE | Facility: CLINIC | Age: 60
End: 2020-05-17
Payer: COMMERCIAL

## 2020-05-17 VITALS
DIASTOLIC BLOOD PRESSURE: 90 MMHG | HEART RATE: 58 BPM | TEMPERATURE: 98.5 F | SYSTOLIC BLOOD PRESSURE: 175 MMHG | RESPIRATION RATE: 18 BRPM | OXYGEN SATURATION: 98 %

## 2020-05-17 DIAGNOSIS — M75.21 TENDONITIS OF LONG HEAD OF BICEPS BRACHII OF RIGHT SHOULDER: Primary | ICD-10-CM

## 2020-05-17 PROCEDURE — 99213 OFFICE O/P EST LOW 20 MIN: CPT | Performed by: PHYSICIAN ASSISTANT

## 2020-05-17 RX ORDER — METHYLPREDNISOLONE 4 MG/1
TABLET ORAL
Qty: 1 EACH | Refills: 0 | Status: SHIPPED | OUTPATIENT
Start: 2020-05-17

## 2021-03-25 ENCOUNTER — OFFICE VISIT (OUTPATIENT)
Dept: URGENT CARE | Facility: CLINIC | Age: 61
End: 2021-03-25
Payer: COMMERCIAL

## 2021-03-25 VITALS
HEART RATE: 76 BPM | WEIGHT: 87 LBS | HEIGHT: 60 IN | TEMPERATURE: 97.5 F | RESPIRATION RATE: 18 BRPM | OXYGEN SATURATION: 98 % | BODY MASS INDEX: 17.08 KG/M2

## 2021-03-25 DIAGNOSIS — B34.9 VIRAL SYNDROME: Primary | ICD-10-CM

## 2021-03-25 PROCEDURE — U0005 INFEC AGEN DETEC AMPLI PROBE: HCPCS | Performed by: PHYSICIAN ASSISTANT

## 2021-03-25 PROCEDURE — U0003 INFECTIOUS AGENT DETECTION BY NUCLEIC ACID (DNA OR RNA); SEVERE ACUTE RESPIRATORY SYNDROME CORONAVIRUS 2 (SARS-COV-2) (CORONAVIRUS DISEASE [COVID-19]), AMPLIFIED PROBE TECHNIQUE, MAKING USE OF HIGH THROUGHPUT TECHNOLOGIES AS DESCRIBED BY CMS-2020-01-R: HCPCS | Performed by: PHYSICIAN ASSISTANT

## 2021-03-25 PROCEDURE — 99213 OFFICE O/P EST LOW 20 MIN: CPT | Performed by: PHYSICIAN ASSISTANT

## 2021-03-25 RX ORDER — ONDANSETRON 4 MG/1
4 TABLET, ORALLY DISINTEGRATING ORAL EVERY 6 HOURS PRN
Qty: 20 TABLET | Refills: 0 | Status: SHIPPED | OUTPATIENT
Start: 2021-03-25

## 2021-03-25 NOTE — PROGRESS NOTES
[address]        NAME: Jordy Montero is a 64 y o  female  : 1960    MRN: [id number]  DATE: 2021  TIME: 1:50 PM    Assessment and Plan   Viral syndrome [B34 9]  1  Viral syndrome  [facility]Covid-[facility]    ondansetron (ZOFRAN-ODT) 4 mg disintegrating tablet         Patient Instructions     Take zofran as prescribed  Fluids (pedialyte) and rest  Broths and clear soups  BRAT diet (bananas, rice, applesauce, and toast)  Progress to normal diet as tolerated  Avoid dairy while symptoms persist  Tylenol for pain/fever  Follow up with PCP in 3-5 days  Proceed to  ER if symptoms worsen  Patient instructed to disinfect common household surfaces, do not share drinks, clean dishes in hot soap and water ( is best), and avoid preparing food for an additional week  Virus may continue to spread after symptoms have resolved  Chief Complaint     Chief Complaint   Patient presents with    Nausea     x 4 days    Vomiting         History of Present Illness       Denies suspect food intake, recent abx, non-potable water sources  Vomiting   This is a new problem  The current episode started in the past 7 days  The problem has been resolved  The emesis has an appearance of stomach contents  There has been no fever  Associated symptoms include chills, diarrhea and headaches (resolved)  Pertinent negatives include no abdominal pain, chest pain, coughing, dizziness, fever or myalgias  Risk factors include ill contacts (other family members reports similar symptoms)  Treatments tried: ate toast this morning  Review of Systems   Review of Systems   Constitutional: Positive for chills and fatigue  Negative for activity change, appetite change and fever  Respiratory: Negative for cough and shortness of breath  Cardiovascular: Negative for chest pain and palpitations     Gastrointestinal: Positive for diarrhea, nausea (persists) and vomiting (resolved)  Negative for abdominal distention, abdominal pain, anal bleeding, blood in stool and constipation  Genitourinary: Negative for dysuria, flank pain, frequency, hematuria and urgency  Musculoskeletal: Negative for myalgias  Skin: Negative for rash  Neurological: Positive for headaches (resolved)  Negative for dizziness and light-headedness  Current Medications       Current Outpatient Medications:     amLODIPine (NORVASC) 10 mg tablet, Take 1 tablet by mouth daily, Disp: 30 tablet, Rfl: 0    aspirin (ECOTRIN LOW STRENGTH) 81 mg EC tablet, Take 1 tablet by mouth daily, Disp: 30 tablet, Rfl: 0    metoprolol tartrate (LOPRESSOR) 50 mg tablet, Take 1 tablet by mouth every 12 (twelve) hours, Disp: 60 tablet, Rfl: 0    FLUoxetine (PROzac) 20 MG tablet, Take 20 mg by mouth daily, Disp: , Rfl:     methylPREDNISolone 4 MG tablet therapy pack, Use as directed on package (Patient not taking: Reported on 3/25/2021), Disp: 1 each, Rfl: 0    ondansetron (ZOFRAN-ODT) 4 mg disintegrating tablet, Take 1 tablet (4 mg total) by mouth every 6 (six) hours as needed for nausea or vomiting, Disp: 20 tablet, Rfl: 0    Current Allergies     Allergies as of 03/25/2021    (No Known Allergies)            The following portions of the patient's history were reviewed and updated as appropriate: allergies, current medications, past family history, past medical history, past social history, past surgical history and problem list      Past Medical History:   Diagnosis Date    Hypertension        History reviewed  No pertinent surgical history  History reviewed  No pertinent family history  Medications have been verified  Objective   Pulse 76   Temp 97 5 °F (36 4 °C)   Resp 18   Ht 5' (1 524 m)   Wt 39 5 kg (87 lb)   SpO2 98%   BMI 16 99 kg/m²   No LMP recorded   Patient is postmenopausal        Physical Exam     Physical Exam  Constitutional:       General: She is not in acute distress  Appearance: She is well-developed  She is not diaphoretic  HENT:      Mouth/Throat:      Mouth: Mucous membranes are moist    Cardiovascular:      Rate and Rhythm: Normal rate and regular rhythm  Heart sounds: No murmur  No friction rub  No gallop  Pulmonary:      Effort: Pulmonary effort is normal  No respiratory distress  Breath sounds: Normal breath sounds  No wheezing or rales  Chest:      Chest wall: No tenderness  Abdominal:      General: Bowel sounds are normal  There is no distension  Palpations: Abdomen is soft  There is no mass  Tenderness: There is no abdominal tenderness  There is no guarding or rebound  Comments:      Lymphadenopathy:      Cervical: No cervical adenopathy  Skin:     General: Skin is warm  Neurological:      Mental Status: She is alert  Psychiatric:         Behavior: Behavior normal          Thought Content:  Thought content normal          Judgment: Judgment normal

## 2021-03-25 NOTE — LETTER
March 25, 2021     Patient: Maury Dunaway   YOB: 1960   Date of Visit: 3/25/2021       To Whom It May Concern: It is my medical opinion that Maury Dunaway should remain out of work until cleared by a healthcare provider  If you have any questions or concerns, please don't hesitate to call           Sincerely,        Brayan Brunner PA-C    CC: Maury Clemonsinocencia

## 2021-03-25 NOTE — PATIENT INSTRUCTIONS
Take zofran as prescribed  Fluids (pedialyte) and rest  Broths and clear soups  BRAT diet (bananas, rice, applesauce, and toast)  Progress to normal diet as tolerated  Avoid dairy while symptoms persist  Tylenol for pain/fever  Follow up with PCP in 3-5 days  Proceed to  ER if symptoms worsen  Patient instructed to disinfect common household surfaces, do not share drinks, clean dishes in hot soap and water ( is best), and avoid preparing food for an additional week  Virus may continue to spread after symptoms have resolved  [address]    Your healthcare provider and/or public health staff have evaluated you and have determined that you do not need to remain in the hospital at this time  At this time you can be isolated at home where you will be monitored by staff from your local or state health department  You should carefully follow the prevention and isolation steps below until a healthcare provider or local or state health department says that you can return to your normal activities  Stay home except to get medical care    People who are mildly ill with COVID-19 are able to isolate at home during their illness  You should restrict activities outside your home, except for getting medical care  Do not go to work, school, or public areas  Avoid using public transportation, ride-sharing, or taxis  Separate yourself from other people and animals in your home    People: As much as possible, you should stay in a specific room and away from other people in your home  Also, you should use a separate bathroom, if available  Animals: You should restrict contact with pets and other animals while you are sick with COVID-19, just like you would around other people   Although there have not been reports of pets or other animals becoming sick with COVID-19, it is still recommended that people sick with COVID-19 limit contact with animals until more information is known about the virus  When possible, have another member of your household care for your animals while you are sick  If you are sick with COVID-19, avoid contact with your pet, including petting, snuggling, being kissed or licked, and sharing food  If you must care for your pet or be around animals while you are sick, wash your hands before and after you interact with pets and wear a facemask  See COVID-19 and Animals for more information  Call ahead before visiting your doctor    If you have a medical appointment, call the healthcare provider and tell them that you have or may have COVID-19  This will help the healthcare providers office take steps to keep other people from getting infected or exposed  Wear a facemask    You should wear a facemask when you are around other people (e g , sharing a room or vehicle) or pets and before you enter a healthcare providers office  If you are not able to wear a facemask (for example, because it causes trouble breathing), then people who live with you should not stay in the same room with you, or they should wear a facemask if they enter your room  Cover your coughs and sneezes    Cover your mouth and nose with a tissue when you cough or sneeze  Throw used tissues in a lined trash can  Immediately wash your hands with soap and water for at least 20 seconds or, if soap and water are not available, clean your hands with an alcohol-based hand  that contains at least 60% alcohol  Clean your hands often    Wash your hands often with soap and water for at least 20 seconds, especially after blowing your nose, coughing, or sneezing; going to the bathroom; and before eating or preparing food  If soap and water are not readily available, use an alcohol-based hand  with at least 60% alcohol, covering all surfaces of your hands and rubbing them together until they feel dry  Soap and water are the best option if hands are visibly dirty   Avoid touching your eyes, nose, and mouth with unwashed hands  Avoid sharing personal household items    You should not share dishes, drinking glasses, cups, eating utensils, towels, or bedding with other people or pets in your home  After using these items, they should be washed thoroughly with soap and water  Clean all high-touch surfaces everyday    High touch surfaces include counters, tabletops, doorknobs, bathroom fixtures, toilets, phones, keyboards, tablets, and bedside tables  Also, clean any surfaces that may have blood, stool, or body fluids on them  Use a household cleaning spray or wipe, according to the label instructions  Labels contain instructions for safe and effective use of the cleaning product including precautions you should take when applying the product, such as wearing gloves and making sure you have good ventilation during use of the product  Monitor your symptoms    Seek prompt medical attention if your illness is worsening (e g , difficulty breathing)  Before seeking care, call your healthcare provider and tell them that you have, or are being evaluated for, COVID-19  Put on a facemask before you enter the facility  These steps will help the healthcare providers office to keep other people in the office or waiting room from getting infected or exposed  Ask your healthcare provider to call the local or [facility] health department  Persons who are placed under active monitoring or facilitated self-monitoring should follow instructions provided by their local health department or occupational health professionals, as appropriate  If you have a medical emergency and need to call 911, notify the dispatch personnel that you have, or are being evaluated for COVID-19  If possible, put on a facemask before emergency medical services arrive      Discontinuing home isolation    Patients with confirmed COVID-19 should remain under home isolation precautions until the following conditions are met:   - They have had no fever for at least 24 hours (that is one full day of no fever without the use medicine that reduces fevers)  AND  - other symptoms have improved (for example, when their cough or shortness of breath have improved)  AND  - If had mild or moderate illness, at least 10 days have passed since their symptoms first appeared or if severe illness (needed oxygen) or immunosuppressed, at least 20 days have passed since symptoms first appeared  Patients with confirmed COVID-19 should also notify close contacts (including their workplace) and ask that they self-quarantine  Currently, close contact is defined as being within 6 feet for 15 minutes or more from the period 24 hours starting 48 hours before symptom onset to the time at which the patient went into isolation  Close contacts of patients diagnosed with COVID-19 should be instructed by the patient to self-quarantine for 14 days from the last time of their last contact with the patient  Source: [employer]    Gastroenteritis   WHAT YOU NEED TO KNOW:   Gastroenteritis, or stomach flu, is an infection of the stomach and intestines  DISCHARGE INSTRUCTIONS:   Call 911 for any of the following:   · You have trouble breathing or a very fast pulse  Return to the emergency department if:   · You see blood in your diarrhea  · You cannot stop vomiting  · You have not urinated for 12 hours  · You feel like you are going to faint  Contact your healthcare provider if:   · You have a fever  · You continue to vomit or have diarrhea, even after treatment  · You see worms in your diarrhea  · Your mouth or eyes are dry  You are not urinating as much or as often  · You have questions or concerns about your condition or care  Medicines:   · Medicines  may be given to stop vomiting or diarrhea, decrease abdominal cramps, or treat an infection  · Take your medicine as directed    Contact your healthcare provider if you think your medicine is not helping or if you have side effects  Tell him or her if you are allergic to any medicine  Keep a list of the medicines, vitamins, and herbs you take  Include the amounts, and when and why you take them  Bring the list or the pill bottles to follow-up visits  Carry your medicine list with you in case of an emergency  Manage your symptoms:   · Drink liquids as directed  Ask your healthcare provider how much liquid to drink each day, and which liquids are best for you  You may also need to drink an oral rehydration solution (ORS)  An ORS has the right amounts of sugar, salt, and minerals in water to replace body fluids  · Eat bland foods  When you feel hungry, begin eating soft, bland foods  Examples are bananas, clear soup, potatoes, and applesauce  Do not have dairy products, alcohol, sugary drinks, or drinks with caffeine until you feel better  · Rest as much as possible  Slowly start to do more each day when you begin to feel better  Prevent the spread of gastroenteritis:  Gastroenteritis can spread easily  Keep yourself, your family, and your surroundings clean to help prevent the spread of gastroenteritis:  · Wash your hands often  Use soap and water  Wash your hands after you use the bathroom, change a child's diapers, or sneeze  Wash your hands before you prepare or eat food  · Clean surfaces and do laundry often  Wash your clothes and towels separately from the rest of the laundry  Clean surfaces in your home with antibacterial  or bleach  · Clean food thoroughly and cook safely  Wash raw vegetables before you cook  Cook meat, fish, and eggs fully  Do not use the same dishes for raw meat as you do for other foods  Refrigerate any leftover food immediately  · Be aware when you camp or travel  Drink only clean water   Do not drink from rivers or lakes unless you purify or boil the water first  When you travel, drink bottled water and do not add ice  Do not eat fruit that has not been peeled  Do not eat raw fish or meat that is not fully cooked  Follow up with your healthcare provider as directed:  Write down your questions so you remember to ask them during your visits  © Copyright [address] Information is for End User's use only and may not be sold, redistributed or otherwise used for commercial purposes  All illustrations and images included in CareNotes® are the copyrighted property of A D A M , Inc  or [facility] Christiana Betancourt   The above information is an  only  It is not intended as medical advice for individual conditions or treatments  Talk to your doctor, nurse or pharmacist before following any medical regimen to see if it is safe and effective for you

## 2021-03-26 LAB — SARS-COV-2 RNA RESP QL NAA+PROBE: NEGATIVE

## 2021-03-28 ENCOUNTER — TELEPHONE (OUTPATIENT)
Dept: URGENT CARE | Facility: CLINIC | Age: 61
End: 2021-03-28

## 2021-04-02 ENCOUNTER — IMMUNIZATIONS (OUTPATIENT)
Dept: FAMILY MEDICINE CLINIC | Facility: HOSPITAL | Age: 61
End: 2021-04-02

## 2021-04-02 DIAGNOSIS — Z23 ENCOUNTER FOR IMMUNIZATION: Primary | ICD-10-CM

## 2021-04-02 PROCEDURE — 91301 SARS-COV-2 / COVID-19 MRNA VACCINE (MODERNA) 100 MCG: CPT

## 2021-04-02 PROCEDURE — 0011A SARS-COV-2 / COVID-19 MRNA VACCINE (MODERNA) 100 MCG: CPT

## 2021-05-03 ENCOUNTER — IMMUNIZATIONS (OUTPATIENT)
Dept: FAMILY MEDICINE CLINIC | Facility: HOSPITAL | Age: 61
End: 2021-05-03

## 2021-05-03 DIAGNOSIS — Z23 ENCOUNTER FOR IMMUNIZATION: Primary | ICD-10-CM

## 2021-05-03 PROCEDURE — 91301 SARS-COV-2 / COVID-19 MRNA VACCINE (MODERNA) 100 MCG: CPT

## 2021-05-03 PROCEDURE — 0012A SARS-COV-2 / COVID-19 MRNA VACCINE (MODERNA) 100 MCG: CPT

## 2021-12-28 ENCOUNTER — IMMUNIZATIONS (OUTPATIENT)
Dept: FAMILY MEDICINE CLINIC | Facility: HOSPITAL | Age: 61
End: 2021-12-28

## 2021-12-28 DIAGNOSIS — Z23 ENCOUNTER FOR IMMUNIZATION: Primary | ICD-10-CM

## 2021-12-28 PROCEDURE — 91306 COVID-19 MODERNA VACC 0.25 ML BOOSTER: CPT

## 2021-12-28 PROCEDURE — 0064A COVID-19 MODERNA VACC 0.25 ML BOOSTER: CPT

## 2022-01-24 ENCOUNTER — OFFICE VISIT (OUTPATIENT)
Dept: URGENT CARE | Facility: CLINIC | Age: 62
End: 2022-01-24
Payer: COMMERCIAL

## 2022-01-24 VITALS
DIASTOLIC BLOOD PRESSURE: 100 MMHG | TEMPERATURE: 99 F | HEART RATE: 84 BPM | SYSTOLIC BLOOD PRESSURE: 174 MMHG | OXYGEN SATURATION: 98 % | RESPIRATION RATE: 18 BRPM

## 2022-01-24 DIAGNOSIS — S43.402A SPRAIN OF LEFT SHOULDER, INITIAL ENCOUNTER: Primary | ICD-10-CM

## 2022-01-24 DIAGNOSIS — M79.602 LEFT ARM PAIN: ICD-10-CM

## 2022-01-24 PROCEDURE — 99213 OFFICE O/P EST LOW 20 MIN: CPT

## 2022-01-24 PROCEDURE — 93005 ELECTROCARDIOGRAM TRACING: CPT

## 2022-01-24 RX ORDER — PREDNISONE 50 MG/1
50 TABLET ORAL DAILY
Qty: 5 TABLET | Refills: 0 | Status: SHIPPED | OUTPATIENT
Start: 2022-01-24

## 2022-01-24 NOTE — PROGRESS NOTES
[address] [facility]        NAME: Yudi Conroy is a 63 y o  female  : 1960    MRN: [id number]  DATE: 2022  TIME: 9:28 AM      Assessment and Plan     Sprain of left shoulder, initial encounter [S43 402A]  1  Sprain of left shoulder, initial encounter  predniSONE 50 mg tablet   2  Left arm pain  ECG 12 lead       EKG shows no acute ST abnormalities  Rate 77 bpm   Patient offered referral to orthopedics, declined  Patient Instructions     Take steroids as directed  Recommend to take them in the morning and with food  Call info-link to establish PCP  Recommend to establish PCP and schedule appointment in 3-5 days  Proceed to ER if symptoms worsen  Chief Complaint     Chief Complaint   Patient presents with    Arm Pain     left/shoulder pain x1 day following removing ice from sidewalks  C/O 10/10 pain at present time  Took motrin at 05:30 this am          History of Present Illness       Patient is a 60-year-old female who presents with left arm pain for one day  States she was shoveling snow on Saturday and the pain started one day ago when she woke up  States pain 10/10 with movement  States she took motrin at 5:30 am, with no relief  States she has had bursitis in both arms before and this is what it feels like  Denies prior surgery to arms  States when her arm flared up like this in the past she was given a shot of corticosteroids in her shoulder  Denies DM hx  Patient states she does not have a PCP  States she is suppose to be taking HTN medications but is not  Denies HA or dizziness  Patient aware to follow-up with PCP or go to the ER if symptoms worsen  Review of Systems     Review of Systems   Constitutional: Negative for chills, fatigue and fever  HENT:        Denies jaw pain   Respiratory: Negative for cough and shortness of breath  Cardiovascular: Negative for chest pain     Gastrointestinal: Negative for diarrhea (two days ago, since resolved), nausea and vomiting  Musculoskeletal:        Left arm pain, worse with movement     Neurological: Negative for dizziness, weakness, light-headedness and headaches  All other systems reviewed and are negative  Current Medications       Current Outpatient Medications:     amLODIPine (NORVASC) 10 mg tablet, Take 1 tablet by mouth daily (Patient not taking: Reported on 1/24/2022 ), Disp: 30 tablet, Rfl: 0    aspirin (ECOTRIN LOW STRENGTH) 81 mg EC tablet, Take 1 tablet by mouth daily (Patient not taking: Reported on 1/24/2022 ), Disp: 30 tablet, Rfl: 0    FLUoxetine (PROzac) 20 MG tablet, Take 20 mg by mouth daily (Patient not taking: Reported on 1/24/2022 ), Disp: , Rfl:     methylPREDNISolone 4 MG tablet therapy pack, Use as directed on package (Patient not taking: Reported on 3/25/2021), Disp: 1 each, Rfl: 0    metoprolol tartrate (LOPRESSOR) 50 mg tablet, Take 1 tablet by mouth every 12 (twelve) hours (Patient not taking: Reported on 1/24/2022 ), Disp: 60 tablet, Rfl: 0    ondansetron (ZOFRAN-ODT) 4 mg disintegrating tablet, Take 1 tablet (4 mg total) by mouth every 6 (six) hours as needed for nausea or vomiting (Patient not taking: Reported on 1/24/2022 ), Disp: 20 tablet, Rfl: 0    predniSONE 50 mg tablet, Take 1 tablet (50 mg total) by mouth daily, Disp: 5 tablet, Rfl: 0    Current Allergies     Allergies as of 01/24/2022    (No Known Allergies)              The following portions of the patient's history were reviewed and updated as appropriate: allergies, current medications, past family history, past medical history, past social history, past surgical history and problem list      Past Medical History:   Diagnosis Date    Hypertension        History reviewed  No pertinent surgical history  History reviewed  No pertinent family history  Medications have been verified  Objective     BP (!) 174/100   Pulse 84   Temp 99 °F (37 2 °C)   Resp 18   SpO2 98%   No LMP recorded   Patient is postmenopausal          Physical Exam     Physical Exam  Vitals and nursing note reviewed  Constitutional:       General: She is awake  She is not in acute distress  Appearance: Normal appearance  She is not ill-appearing, toxic-appearing or diaphoretic  Cardiovascular:      Rate and Rhythm: Normal rate  Pulses: Normal pulses  Heart sounds: Normal heart sounds, S1 normal and S2 normal  No murmur heard  Pulmonary:      Effort: Pulmonary effort is normal  No respiratory distress  Breath sounds: Normal breath sounds and air entry  No stridor, decreased air movement or transmitted upper airway sounds  No decreased breath sounds  Musculoskeletal:      Left shoulder: Tenderness present  No swelling or deformity  Normal range of motion  Normal strength  Normal pulse  Arms:    Skin:     General: Skin is warm  Capillary Refill: Capillary refill takes less than 2 seconds  Neurological:      Mental Status: She is alert  Psychiatric:         Mood and Affect: Mood normal          Behavior: Behavior normal          Thought Content:  Thought content normal          Judgment: Judgment normal

## 2022-01-24 NOTE — PROGRESS NOTES
[address] [facility]        NAME: Paulo Piña is a 63 y o  female  : 1960    MRN: [id number]  DATE: 2022  TIME: 9:19 AM      Assessment and Plan     Left arm pain [M79 [id number]]  1  Left arm pain  ECG 12 lead         Patient Instructions     Follow up with PCP in 3-5 days  Proceed to  ER if symptoms worsen  Chief Complaint     Chief Complaint   Patient presents with    Arm Pain     left/shoulder pain x1 day following removing ice from sidewalks  C/O 10/10 pain at present time   Took motrin at 05:30 this am          History of Present Illness     HPI    Review of Systems     Review of Systems      Current Medications       Current Outpatient Medications:     amLODIPine (NORVASC) 10 mg tablet, Take 1 tablet by mouth daily (Patient not taking: Reported on 2022 ), Disp: 30 tablet, Rfl: 0    aspirin (ECOTRIN LOW STRENGTH) 81 mg EC tablet, Take 1 tablet by mouth daily (Patient not taking: Reported on 2022 ), Disp: 30 tablet, Rfl: 0    FLUoxetine (PROzac) 20 MG tablet, Take 20 mg by mouth daily (Patient not taking: Reported on 2022 ), Disp: , Rfl:     methylPREDNISolone 4 MG tablet therapy pack, Use as directed on package (Patient not taking: Reported on 3/25/2021), Disp: 1 each, Rfl: 0    metoprolol tartrate (LOPRESSOR) 50 mg tablet, Take 1 tablet by mouth every 12 (twelve) hours (Patient not taking: Reported on 2022 ), Disp: 60 tablet, Rfl: 0    ondansetron (ZOFRAN-ODT) 4 mg disintegrating tablet, Take 1 tablet (4 mg total) by mouth every 6 (six) hours as needed for nausea or vomiting (Patient not taking: Reported on 2022 ), Disp: 20 tablet, Rfl: 0    Current Allergies     Allergies as of 2022    (No Known Allergies)              The following portions of the patient's history were reviewed and updated as appropriate: allergies, current medications, past family history, past medical history, past social history, past surgical history and problem list  Past Medical History:   Diagnosis Date    Hypertension        History reviewed  No pertinent surgical history  History reviewed  No pertinent family history  Medications have been verified  Objective     BP (!) 174/100   Pulse 84   Temp 100 2 °F (37 9 °C)   Resp 18   SpO2 98%   No LMP recorded   Patient is postmenopausal          Physical Exam     Physical Exam

## 2022-01-24 NOTE — PATIENT INSTRUCTIONS
Take steroids as directed  Recommend to take them in the morning and with food  Call info-link to establish PCP  Recommend to establish PCP and schedule appointment in 3-5 days  Proceed to ER if symptoms worsen  Shoulder Pain   AMBULATORY CARE:   Shoulder pain  is a common problem that can affect your daily activities  Pain can be caused by a problem within your shoulder, such as soreness of a tendon or bursa  A tendon is a cord of tough tissue that connects your muscles to your bones  The bursa is a fluid-filled sac that acts as a cushion between a bone and a tendon  Shoulder pain may also be caused by pain that spreads to your shoulder from another part of your body  Seek care immediately if:   · You have severe pain  · You cannot move your arm or shoulder  · You have numbness or tingling in your shoulder or arm  Contact your healthcare provider if:   · Your pain gets worse or does not go away with treatment  · You have trouble moving your arm or shoulder  · You have questions or concerns about your condition or care  Treatment for shoulder pain  may include any of the following:  · Acetaminophen  decreases pain and fever  It is available without a doctor's order  Ask how much to take and how often to take it  Follow directions  Read the labels of all other medicines you are using to see if they also contain acetaminophen, or ask your doctor or pharmacist  Acetaminophen can cause liver damage if not taken correctly  Do not use more than 4 grams (4,000 milligrams) total of acetaminophen in one day  · NSAIDs , such as ibuprofen, help decrease swelling, pain, and fever  This medicine is available with or without a doctor's order  NSAIDs can cause stomach bleeding or kidney problems in certain people  If you take blood thinner medicine, always ask your healthcare provider if NSAIDs are safe for you  Always read the medicine label and follow directions      · A steroid injection  may help decrease pain and swelling  · Surgery  may be needed for long-term pain and loss of function  Manage your symptoms:   · Apply ice  on your shoulder for 20 to 30 minutes every 2 hours or as directed  Use an ice pack, or put crushed ice in a plastic bag  Cover it with a towel before you apply it to your shoulder  Ice helps prevent tissue damage and decreases swelling and pain  · Apply heat if ice does not help your symptoms  Apply heat on your shoulder for 20 to 30 minutes every 2 hours for as many days as directed  Heat helps decrease pain and muscle spasms  · Limit activities as directed  Try to avoid repeated overhead movements  · Go to physical or occupational therapy as directed  A physical therapist teaches you exercises to help improve movement and strength, and to decrease pain  An occupational therapist teaches you skills to help with your daily activities  Prevent shoulder pain:   · Maintain a good range of motion in your shoulder  Ask your healthcare provider which exercises you should do on a regular basis after you have healed  · Stretch and strengthen your shoulder  Use proper technique during exercises and sports  Follow up with your healthcare provider or orthopedist as directed:  Write down your questions so you remember to ask them during your visits  © Copyright [employer] 2021 Information is for End User's use only and may not be sold, redistributed or otherwise used for commercial purposes  All illustrations and images included in CareNotes® are the copyrighted property of [employer]  or Danii Esquivel  The above information is an  only  It is not intended as medical advice for individual conditions or treatments  Talk to your doctor, nurse or pharmacist before following any medical regimen to see if it is safe and effective for you

## 2022-01-25 LAB
ATRIAL RATE: 77 BPM
ATRIAL RATE: 77 BPM
P AXIS: 64 DEGREES
P AXIS: 64 DEGREES
PR INTERVAL: 114 MS
PR INTERVAL: 114 MS
QRS AXIS: 56 DEGREES
QRS AXIS: 56 DEGREES
QRSD INTERVAL: 70 MS
QRSD INTERVAL: 70 MS
QT INTERVAL: 374 MS
QT INTERVAL: 374 MS
QTC INTERVAL: 423 MS
QTC INTERVAL: 423 MS
T WAVE AXIS: 55 DEGREES
T WAVE AXIS: 55 DEGREES
VENTRICULAR RATE: 77 BPM
VENTRICULAR RATE: 77 BPM

## 2022-01-25 PROCEDURE — 93010 ELECTROCARDIOGRAM REPORT: CPT | Performed by: INTERNAL MEDICINE

## 2024-07-09 ENCOUNTER — HOSPITAL ENCOUNTER (EMERGENCY)
Facility: HOSPITAL | Age: 64
Discharge: HOME | End: 2024-07-09
Attending: EMERGENCY MEDICINE
Payer: COMMERCIAL

## 2024-07-09 ENCOUNTER — OFFICE VISIT (OUTPATIENT)
Dept: URGENT CARE | Facility: MEDICAL CENTER | Age: 64
End: 2024-07-09
Payer: COMMERCIAL

## 2024-07-09 ENCOUNTER — APPOINTMENT (EMERGENCY)
Dept: RADIOLOGY | Facility: HOSPITAL | Age: 64
End: 2024-07-09
Payer: COMMERCIAL

## 2024-07-09 VITALS
TEMPERATURE: 98 F | OXYGEN SATURATION: 95 % | HEART RATE: 63 BPM | SYSTOLIC BLOOD PRESSURE: 141 MMHG | DIASTOLIC BLOOD PRESSURE: 86 MMHG | RESPIRATION RATE: 20 BRPM

## 2024-07-09 VITALS
HEART RATE: 86 BPM | SYSTOLIC BLOOD PRESSURE: 230 MMHG | RESPIRATION RATE: 18 BRPM | OXYGEN SATURATION: 97 % | DIASTOLIC BLOOD PRESSURE: 128 MMHG | TEMPERATURE: 98.6 F

## 2024-07-09 DIAGNOSIS — E87.6 HYPOKALEMIA: ICD-10-CM

## 2024-07-09 DIAGNOSIS — R31.29 MICROSCOPIC HEMATURIA: ICD-10-CM

## 2024-07-09 DIAGNOSIS — I16.0 HYPERTENSIVE URGENCY: Primary | ICD-10-CM

## 2024-07-09 DIAGNOSIS — R19.5 LOOSE STOOLS: ICD-10-CM

## 2024-07-09 DIAGNOSIS — I10 UNCONTROLLED HYPERTENSION: Primary | ICD-10-CM

## 2024-07-09 LAB
2HR DELTA HS TROPONIN: -1 NG/L
ALBUMIN SERPL BCG-MCNC: 4.8 G/DL (ref 3.5–5)
ALP SERPL-CCNC: 80 U/L (ref 34–104)
ALT SERPL W P-5'-P-CCNC: 15 U/L (ref 7–52)
ANION GAP SERPL CALCULATED.3IONS-SCNC: 13 MMOL/L (ref 4–13)
AST SERPL W P-5'-P-CCNC: 24 U/L (ref 13–39)
BACTERIA UR QL AUTO: NORMAL /HPF
BASOPHILS # BLD AUTO: 0.03 THOUSANDS/ÂΜL (ref 0–0.1)
BASOPHILS NFR BLD AUTO: 0 % (ref 0–1)
BILIRUB SERPL-MCNC: 0.76 MG/DL (ref 0.2–1)
BILIRUB UR QL STRIP: NEGATIVE
BUN SERPL-MCNC: 14 MG/DL (ref 5–25)
CALCIUM SERPL-MCNC: 10.2 MG/DL (ref 8.4–10.2)
CARDIAC TROPONIN I PNL SERPL HS: 5 NG/L
CARDIAC TROPONIN I PNL SERPL HS: 6 NG/L
CHLORIDE SERPL-SCNC: 100 MMOL/L (ref 96–108)
CLARITY UR: CLEAR
CO2 SERPL-SCNC: 27 MMOL/L (ref 21–32)
COLOR UR: COLORLESS
CREAT SERPL-MCNC: 0.9 MG/DL (ref 0.6–1.3)
EOSINOPHIL # BLD AUTO: 0.02 THOUSAND/ÂΜL (ref 0–0.61)
EOSINOPHIL NFR BLD AUTO: 0 % (ref 0–6)
ERYTHROCYTE [DISTWIDTH] IN BLOOD BY AUTOMATED COUNT: 12.1 % (ref 11.6–15.1)
GFR SERPL CREATININE-BSD FRML MDRD: 68 ML/MIN/1.73SQ M
GLUCOSE SERPL-MCNC: 96 MG/DL (ref 65–140)
GLUCOSE UR STRIP-MCNC: NEGATIVE MG/DL
HCT VFR BLD AUTO: 42 % (ref 34.8–46.1)
HGB BLD-MCNC: 14.2 G/DL (ref 11.5–15.4)
HGB UR QL STRIP.AUTO: ABNORMAL
IMM GRANULOCYTES # BLD AUTO: 0.02 THOUSAND/UL (ref 0–0.2)
IMM GRANULOCYTES NFR BLD AUTO: 0 % (ref 0–2)
KETONES UR STRIP-MCNC: NEGATIVE MG/DL
LEUKOCYTE ESTERASE UR QL STRIP: NEGATIVE
LYMPHOCYTES # BLD AUTO: 2.02 THOUSANDS/ÂΜL (ref 0.6–4.47)
LYMPHOCYTES NFR BLD AUTO: 26 % (ref 14–44)
MAGNESIUM SERPL-MCNC: 2.2 MG/DL (ref 1.9–2.7)
MCH RBC QN AUTO: 34.9 PG (ref 26.8–34.3)
MCHC RBC AUTO-ENTMCNC: 33.8 G/DL (ref 31.4–37.4)
MCV RBC AUTO: 103 FL (ref 82–98)
MONOCYTES # BLD AUTO: 0.46 THOUSAND/ÂΜL (ref 0.17–1.22)
MONOCYTES NFR BLD AUTO: 6 % (ref 4–12)
NEUTROPHILS # BLD AUTO: 5.32 THOUSANDS/ÂΜL (ref 1.85–7.62)
NEUTS SEG NFR BLD AUTO: 68 % (ref 43–75)
NITRITE UR QL STRIP: NEGATIVE
NON-SQ EPI CELLS URNS QL MICRO: NORMAL /HPF
NRBC BLD AUTO-RTO: 0 /100 WBCS
PH UR STRIP.AUTO: 6.5 [PH]
PLATELET # BLD AUTO: 280 THOUSANDS/UL (ref 149–390)
PMV BLD AUTO: 9.5 FL (ref 8.9–12.7)
POTASSIUM SERPL-SCNC: 3.4 MMOL/L (ref 3.5–5.3)
PROT SERPL-MCNC: 8.1 G/DL (ref 6.4–8.4)
PROT UR STRIP-MCNC: NEGATIVE MG/DL
RBC # BLD AUTO: 4.07 MILLION/UL (ref 3.81–5.12)
RBC #/AREA URNS AUTO: NORMAL /HPF
SODIUM SERPL-SCNC: 140 MMOL/L (ref 135–147)
SP GR UR STRIP.AUTO: 1.01 (ref 1–1.03)
TSH SERPL DL<=0.05 MIU/L-ACNC: 1.4 UIU/ML (ref 0.45–4.5)
UROBILINOGEN UR STRIP-ACNC: <2 MG/DL
WBC # BLD AUTO: 7.87 THOUSAND/UL (ref 4.31–10.16)
WBC #/AREA URNS AUTO: NORMAL /HPF

## 2024-07-09 PROCEDURE — 99284 EMERGENCY DEPT VISIT MOD MDM: CPT

## 2024-07-09 PROCEDURE — 83735 ASSAY OF MAGNESIUM: CPT | Performed by: PHYSICIAN ASSISTANT

## 2024-07-09 PROCEDURE — 85025 COMPLETE CBC W/AUTO DIFF WBC: CPT | Performed by: PHYSICIAN ASSISTANT

## 2024-07-09 PROCEDURE — 81001 URINALYSIS AUTO W/SCOPE: CPT | Performed by: PHYSICIAN ASSISTANT

## 2024-07-09 PROCEDURE — 84443 ASSAY THYROID STIM HORMONE: CPT | Performed by: PHYSICIAN ASSISTANT

## 2024-07-09 PROCEDURE — 36415 COLL VENOUS BLD VENIPUNCTURE: CPT | Performed by: PHYSICIAN ASSISTANT

## 2024-07-09 PROCEDURE — 80053 COMPREHEN METABOLIC PANEL: CPT | Performed by: PHYSICIAN ASSISTANT

## 2024-07-09 PROCEDURE — 99285 EMERGENCY DEPT VISIT HI MDM: CPT | Performed by: PHYSICIAN ASSISTANT

## 2024-07-09 PROCEDURE — 71045 X-RAY EXAM CHEST 1 VIEW: CPT

## 2024-07-09 PROCEDURE — G0381 LEV 2 HOSP TYPE B ED VISIT: HCPCS | Performed by: PHYSICIAN ASSISTANT

## 2024-07-09 PROCEDURE — 93005 ELECTROCARDIOGRAM TRACING: CPT | Performed by: PHYSICIAN ASSISTANT

## 2024-07-09 PROCEDURE — 84484 ASSAY OF TROPONIN QUANT: CPT | Performed by: PHYSICIAN ASSISTANT

## 2024-07-09 RX ORDER — CLONIDINE HYDROCHLORIDE 0.1 MG/1
0.2 TABLET ORAL ONCE
Status: COMPLETED | OUTPATIENT
Start: 2024-07-09 | End: 2024-07-09

## 2024-07-09 RX ORDER — POTASSIUM CHLORIDE 20 MEQ/1
20 TABLET, EXTENDED RELEASE ORAL ONCE
Status: COMPLETED | OUTPATIENT
Start: 2024-07-09 | End: 2024-07-09

## 2024-07-09 RX ORDER — METOPROLOL TARTRATE 50 MG/1
50 TABLET, FILM COATED ORAL EVERY 12 HOURS SCHEDULED
Qty: 60 TABLET | Refills: 0 | Status: SHIPPED | OUTPATIENT
Start: 2024-07-09 | End: 2024-07-19 | Stop reason: SDUPTHER

## 2024-07-09 RX ORDER — METOPROLOL TARTRATE 50 MG/1
50 TABLET, FILM COATED ORAL ONCE
Status: COMPLETED | OUTPATIENT
Start: 2024-07-09 | End: 2024-07-09

## 2024-07-09 RX ORDER — AMLODIPINE BESYLATE 10 MG/1
10 TABLET ORAL DAILY
Qty: 30 TABLET | Refills: 0 | Status: SHIPPED | OUTPATIENT
Start: 2024-07-09 | End: 2024-07-19 | Stop reason: SDUPTHER

## 2024-07-09 RX ORDER — AMLODIPINE BESYLATE 10 MG/1
10 TABLET ORAL ONCE
Status: COMPLETED | OUTPATIENT
Start: 2024-07-09 | End: 2024-07-09

## 2024-07-09 RX ADMIN — POTASSIUM CHLORIDE 20 MEQ: 1500 TABLET, EXTENDED RELEASE ORAL at 13:41

## 2024-07-09 RX ADMIN — METOPROLOL TARTRATE 50 MG: 50 TABLET, FILM COATED ORAL at 12:40

## 2024-07-09 RX ADMIN — AMLODIPINE BESYLATE 10 MG: 10 TABLET ORAL at 15:06

## 2024-07-09 RX ADMIN — CLONIDINE HYDROCHLORIDE 0.2 MG: 0.1 TABLET ORAL at 15:06

## 2024-07-09 NOTE — ED NOTES
Patient aware of need for urine sample, states she is unable to go. Patient provided with water and told to ring when able to go.     Cleo Bond RN  07/09/24 [id number]

## 2024-07-09 NOTE — ED PROVIDER NOTES
History  Chief Complaint   Patient presents with    Hypertension     Pt was seen at urgent care for chills and diarrhea- urent care suggested pt come be evaluated by ED because of hypertension- pt asymptomatic, has not been taking BP meds for over a year      64 year old female with PMH HTN presenting ambulatory from local urgent care for further evaluation.  Pt reports she was there today because she has had loose stools and chills over the past 2 days.  Denies black or bloody stools. Denies fevers.  Denies abdominal pain, nausea, vomiting, constipation.  She notes her blood pressure was elevated and was referred to ER for further evaluation.  Denies headache, dizziness, lightheadedness, visual disturbance.  Denies numbness, tingling, weakness.  Denies chest pain, SOB.  Denies cough, congestion, sore throat.  Denies any urinary complaints such as dysuria, frequency, urgency, hematuria, back or flank pain.  Denies leg swelling or fluid retention.  No recent travel.  No sick contacts.  No suspicious food intake.  No reported aggravating or alleviating factors.  Pt notes she has been off her BP meds for several years.  She notes h/o HTN and was previously on metoprolol and she believes she was also on lisinopril.  She reports her PCP closed office during the pandemic and she hasn't re-established care with anyone in the area.      History provided by:  Patient and medical records   used: No    Hypertension  Chronicity:  Chronic  Relieved by:  Nothing  Worsened by:  Nothing  Associated symptoms: no abdominal pain, no blurred vision, no chest pain, no confusion, no dizziness, no fatigue, no fever, no headaches, no hematuria, no loss of consciousness, no nausea, no neck pain, no palpitations, no peripheral edema, no shortness of breath, no syncope, not vomiting and no weakness    Risk factors: tobacco use (former)    Risk factors: no cardiac disease, no diabetes, no kidney disease and no prior stroke         Prior to Admission Medications   Prescriptions Last Dose Informant Patient Reported? Taking?   FLUoxetine (PROzac) 20 MG tablet   Yes No   Sig: Take 20 mg by mouth daily   Patient not taking: Reported on 1/24/2022    amLODIPine (NORVASC) 10 mg tablet   No No   Sig: Take 1 tablet by mouth daily   Patient not taking: Reported on 1/24/2022    amLODIPine (NORVASC) 10 mg tablet   No Yes   Sig: Take 1 tablet (10 mg total) by mouth daily   aspirin (ECOTRIN LOW STRENGTH) 81 mg EC tablet   No No   Sig: Take 1 tablet by mouth daily   Patient not taking: Reported on 1/24/2022    metoprolol tartrate (LOPRESSOR) 50 mg tablet   No No   Sig: Take 1 tablet by mouth every 12 (twelve) hours   Patient not taking: Reported on 1/24/2022    metoprolol tartrate (LOPRESSOR) 50 mg tablet   No Yes   Sig: Take 1 tablet (50 mg total) by mouth every 12 (twelve) hours   ondansetron (ZOFRAN-ODT) 4 mg disintegrating tablet   No No   Sig: Take 1 tablet (4 mg total) by mouth every 6 (six) hours as needed for nausea or vomiting   Patient not taking: Reported on 1/24/2022       Facility-Administered Medications: None       Past Medical History:   Diagnosis Date    Hypertension        History reviewed. No pertinent surgical history.    History reviewed. No pertinent family history.  I have reviewed and agree with the history as documented.    E-Cigarette/Vaping    E-Cigarette Use Never User      E-Cigarette/Vaping Substances     Social History     Tobacco Use    Smoking status: Former     Current packs/day: 0.00     Average packs/day: 1 pack/day for 33.0 years (33.0 ttl pk-yrs)     Types: Cigarettes     Start date: 10/28/1975     Quit date: 10/28/2008     Years since quitting: 15.7    Smokeless tobacco: Never   Vaping Use    Vaping status: Never Used   Substance Use Topics    Alcohol use: No    Drug use: No       Review of Systems   Constitutional:  Positive for chills. Negative for fatigue and fever.   HENT: Negative.  Negative for congestion,  rhinorrhea and sore throat.    Eyes: Negative.  Negative for blurred vision and visual disturbance.   Respiratory: Negative.  Negative for cough, shortness of breath and wheezing.    Cardiovascular: Negative.  Negative for chest pain, palpitations, leg swelling and syncope.   Gastrointestinal:  Positive for diarrhea. Negative for abdominal pain, constipation, nausea and vomiting.   Genitourinary: Negative.  Negative for dysuria, flank pain, frequency and hematuria.   Musculoskeletal: Negative.  Negative for back pain, myalgias and neck pain.   Skin: Negative.  Negative for rash.   Neurological: Negative.  Negative for dizziness, loss of consciousness, syncope, speech difficulty, weakness, light-headedness, numbness and headaches.   Psychiatric/Behavioral: Negative.  Negative for confusion.    All other systems reviewed and are negative.      Physical Exam  Physical Exam  Vitals and nursing note reviewed.   Constitutional:       General: She is awake. She is not in acute distress.     Appearance: She is well-developed. She is not toxic-appearing or diaphoretic.   HENT:      Head: Normocephalic and atraumatic.      Right Ear: Hearing and external ear normal.      Left Ear: Hearing and external ear normal.      Nose: Nose normal.      Mouth/Throat:      Lips: Pink.      Mouth: Oropharynx is clear and moist and mucous membranes are normal. Mucous membranes are moist.      Tongue: Tongue does not deviate from midline.      Pharynx: Oropharynx is clear. Uvula midline.   Eyes:      General: Lids are normal. No scleral icterus.     Extraocular Movements: EOM normal.      Conjunctiva/sclera: Conjunctivae normal.      Pupils: Pupils are equal, round, and reactive to light.   Neck:      Trachea: Trachea and phonation normal. No tracheal deviation.   Cardiovascular:      Rate and Rhythm: Normal rate and regular rhythm.      Pulses: Normal pulses.           Radial pulses are 2+ on the right side and 2+ on the left side.         Dorsalis pedis pulses are 2+ on the right side and 2+ on the left side.        Posterior tibial pulses are 2+ on the right side and 2+ on the left side.      Heart sounds: Normal heart sounds, S1 normal and S2 normal. No murmur heard.  Pulmonary:      Effort: Pulmonary effort is normal. No tachypnea or respiratory distress.      Breath sounds: Normal breath sounds. No wheezing, rhonchi or rales.   Abdominal:      General: Bowel sounds are normal. There is no distension.      Palpations: Abdomen is soft.      Tenderness: There is no abdominal tenderness. There is no CVA tenderness, guarding or rebound.   Musculoskeletal:         General: No edema.      Cervical back: Normal range of motion and neck supple.      Right lower leg: No edema.      Left lower leg: No edema.   Skin:     General: Skin is warm and dry.      Capillary Refill: Capillary refill takes less than 2 seconds.      Findings: No rash.   Neurological:      General: No focal deficit present.      Mental Status: She is alert and oriented to person, place, and time.      GCS: GCS eye subscore is 4. GCS verbal subscore is 5. GCS motor subscore is 6.      Cranial Nerves: Cranial nerves 2-12 are intact. No cranial nerve deficit.      Sensory: Sensation is intact. No sensory deficit.      Motor: Motor function is intact. No abnormal muscle tone.      Coordination: Coordination is intact.      Gait: Gait normal.   Psychiatric:         Mood and Affect: Mood and affect and mood normal.         Speech: Speech normal. Speech is not slurred.         Behavior: Behavior normal. Behavior is cooperative.         Vital Signs  ED Triage Vitals [07/09/24 1146]   Temperature Pulse Respirations Blood Pressure SpO2   98 °F (36.7 °C) 83 15 (!) 233/106 97 %      Temp Source Heart Rate Source Patient Position - Orthostatic VS BP Location FiO2 (%)   Temporal Monitor Lying Right arm --      Pain Score       No Pain           Vitals:    07/09/24 1515 07/09/24 1530 07/09/24 1545  07/09/24 1600   BP: (!) 194/104 (!) 174/99 163/97 141/86   Pulse: 62 62 64 63   Patient Position - Orthostatic VS:             Visual Acuity      ED Medications  Medications   metoprolol tartrate (LOPRESSOR) tablet 50 mg (50 mg Oral Given 7/9/24 1240)   potassium chloride (Klor-Con M20) CR tablet 20 mEq (20 mEq Oral Given 7/9/24 1341)   amLODIPine (NORVASC) tablet 10 mg (10 mg Oral Given 7/9/24 1506)   cloNIDine (CATAPRES) tablet 0.2 mg (0.2 mg Oral Given 7/9/24 1506)       Diagnostic Studies  Results Reviewed       Procedure Component Value Units Date/Time    Urine Microscopic [[id number]]  (Normal) Collected: 07/09/24 1509    Lab Status: Final result Specimen: Urine, Clean Catch Updated: 07/09/24 1626     RBC, UA 0-1 /hpf      WBC, UA None Seen /hpf      Epithelial Cells None Seen /hpf      Bacteria, UA None Seen /hpf     UA (URINE) with reflex to Scope [[id number]]  (Abnormal) Collected: 07/09/24 1509    Lab Status: Final result Specimen: Urine, Clean Catch Updated: 07/09/24 1537     Color, UA Colorless     Clarity, UA Clear     Specific Gravity, UA 1.010     pH, UA 6.5     Leukocytes, UA Negative     Nitrite, UA Negative     Protein, UA Negative mg/dl      Glucose, UA Negative mg/dl      Ketones, UA Negative mg/dl      Urobilinogen, UA <2.0 mg/dl      Bilirubin, UA Negative     Occult Blood, UA Small    HS Troponin I 2hr [[id number]]  (Normal) Collected: 07/09/24 1455    Lab Status: Final result Specimen: Blood from Arm, Right Updated: 07/09/24 1522     hs TnI 2hr 5 ng/L      Delta 2hr hsTnI -1 ng/L     HS Troponin 0hr (reflex protocol) [[id number]]  (Normal) Collected: 07/09/24 1239    Lab Status: Final result Specimen: Blood from Arm, Right Updated: 07/09/24 1308     hs TnI 0hr 6 ng/L     TSH, 3rd generation with Free T4 reflex [[id number]]  (Normal) Collected: 07/09/24 1212    Lab Status: Final result Specimen: Blood from Arm, Left Updated: 07/09/24 1256     TSH 3RD [facility] 1.396 uIU/mL     Comprehensive  metabolic panel [[id number]]  (Abnormal) Collected: 07/09/24 1212    Lab Status: Final result Specimen: Blood from Arm, Left Updated: 07/09/24 1243     Sodium 140 mmol/L      Potassium 3.4 mmol/L      Chloride 100 mmol/L      CO2 27 mmol/L      ANION GAP 13 mmol/L      BUN 14 mg/dL      Creatinine 0.90 mg/dL      Glucose 96 mg/dL      Calcium 10.2 mg/dL      AST 24 U/L      ALT 15 U/L      Alkaline Phosphatase 80 U/L      Total Protein 8.1 g/dL      Albumin 4.8 g/dL      Total Bilirubin 0.76 mg/dL      eGFR 68 ml/min/1.73sq m     Narrative:      National Kid[employer] Foundation guidelines for Chronic Kidney Disease (CKD):     Stage 1 with normal or high GFR (GFR > 90 mL/min/1.73 square meters)    Stage 2 Mild CKD (GFR = 60-89 mL/min/1.73 square meters)    Stage 3A Moderate CKD (GFR = 45-59 mL/min/1.73 square meters)    Stage 3B Moderate CKD (GFR = 30-44 mL/min/1.73 square meters)    Stage 4 Severe CKD (GFR = 15-29 mL/min/1.73 square meters)    Stage 5 End Stage CKD (GFR <15 mL/min/1.73 square meters)  Note: GFR calculation is accurate only with a steady state creatinine    Magnesium [[id number]]  (Normal) Collected: 07/09/24 1212    Lab Status: Final result Specimen: Blood from Arm, Left Updated: 07/09/24 1243     Magnesium 2.2 mg/dL     CBC and differential [[id number]]  (Abnormal) Collected: 07/09/24 1212    Lab Status: Final result Specimen: Blood from Arm, Left Updated: 07/09/24 1224     WBC 7.87 Thousand/uL      RBC 4.07 Million/uL      Hemoglobin 14.2 g/dL      Hematocrit 42.0 %       fL      MCH 34.9 pg      MCHC 33.8 g/dL      RDW 12.1 %      MPV 9.5 fL      Platelets 280 Thousands/uL      nRBC 0 /100 WBCs      Segmented % 68 %      Immature Grans % 0 %      Lymphocytes % 26 %      Monocytes % 6 %      Eosinophils Relative 0 %      Basophils Relative 0 %      Absolute Neutrophils 5.32 Thousands/µL      Absolute Immature Grans 0.02 Thousand/uL      Absolute Lymphocytes 2.02 Thousands/µL      Absolute  Monocytes 0.46 Thousand/µL      Eosinophils Absolute 0.02 Thousand/µL      Basophils Absolute 0.03 Thousands/µL                    XR chest 1 view portable    (Results Pending)              Procedures  ECG 12 Lead Documentation Only    Date/Time: 7/9/2024 11:54 PM    Performed by: Casie Trevino PA-C  Authorized by: Casie Trevino PA-C    Indications / Diagnosis:  HTN  ECG reviewed by me, the ED Provider: yes    Patient location:  ED  Previous ECG:     Previous ECG:  Compared to current    Comparison ECG info:  1/24/22    Comparison to cardiac monitor: Yes    Rate:     ECG rate:  83    ECG rate assessment: normal    Rhythm:     Rhythm: sinus rhythm    Ectopy:     Ectopy: none    QRS:     QRS axis:  Normal    QRS intervals:  Normal  Conduction:     Conduction: normal    ST segments:     ST segments:  Normal  T waves:     T waves: normal    Other findings:     Other findings: LVH    Comments:      , QRS 70, QT//556           ED Course  ED Course as of 07/09/24 1831 Tue Jul 09, 2024   1152 Prior records reviewed.  Was seen at urgent care today and referred to ER for asymptomatic HTN.  Per med list, was previously on norvasc 10 mg daily and metoprolol 50 mg BID.   1227 WBC: 7.87   1227 Hemoglobin: 14.2   1227 Platelet Count: 280   1257 GLUCOSE: 96   1258 Creatinine: 0.90  stable   1258 BUN: 14   1258 Sodium: 140   1258 Potassium(!): 3.4  Mildly decreased, will orally replete   1258 Chloride: 100   1258 Carbon Dioxide: 27   1258 ANION GAP: 13   1258 Calcium: 10.2   1258 AST: 24   1258 ALT: 15   1258 ALK PHOS: 80   1258 Total Protein: 8.1   1258 Albumin: 4.8   1258 Total Bilirubin: 0.76   1258 GFR, Calculated: 68   1258 MAGNESIUM: 2.2   1258 TSH 3RD GENERATON: 1.396   1312 hs TnI 0hr: 6  ACS unlikely   1406 XR chest 1 view portable  Independently viewed and interpreted by me - no acute cardiopulmonary process, appears to have small nodular density in right middle lobe, no prior CXR for comparison;  pending official read.   1506 Pt ambulatory from bathroom, steady gait.  2 hr troponin in process.   1522 Delta 2hr hsTnI: -1  Not c/w ACS   1541 UA shows small blood, otherwise negative.  No proteinuria.  No signs of infection.   1544 Pt updated. BP better on recheck.  No evidence of end organ damage.  She remains asymptomatic.  Will resume previously prescribed anti-hypertensives and have pt follow up with primary care.                                 SBIRT 20yo+      Flowsheet Row Most Recent Value   Initial Alcohol Screen: US AUDIT-C     1. How often do you have a drink containing alcohol? 0 Filed at: 07/09/2024 1144   2. How many drinks containing alcohol do you have on a typical day you are drinking?  0 Filed at: 07/09/2024 1144   3a. Male UNDER 65: How often do you have five or more drinks on one occasion? 0 Filed at: 07/09/2024 1144   3b. FEMALE Any Age, or MALE 65+: How often do you have 4 or more drinks on one occassion? 0 Filed at: 07/09/2024 1144   Audit-C Score 0 Filed at: 07/09/2024 1144   SABRINA: How many times in the past year have you...    Used an illegal drug or used a prescription medication for non-medical reasons? Never Filed at: 07/09/2024 1144                      Medical Decision Making  63 yo female presenting for evaluation of asymptomatic hypertension.  Prior records reviewed.  She has been off previously prescribed anti-hypertensives for several years.  She is asymptomatic.  No findings to suggest stroke.  Will obtain EKG, CXR, labs, urine to assess for end organ damage.  Will re-initiate anti-hypertensive and continue to monitor.     Work up obtained as noted above.  EKG and troponin not c/w ACS.  CXR does not reveal pneumonia, pneumothorax, vascular congestion or pleural effusion.  No noted leukocytosis, no anemia.  No infectious concerns.  No hypo or hyperglycemia.  Renal function within normal limits.  Electrolytes within normal limits except a mild hypokalemia which was repleted.  UA  not suggestive of infection.  BP much improved on recheck but still slightly elevated. She is asymptomatic.  She has h/o hypertension but currently not taking prescribed medications. Uncontrolled hypertension appears to be in setting of non compliance.  Discussed admission vs discharge.  Pt prefers to go home.  Will have her resume her previously prescribed medications and have her follow up with PCP for BP recheck.  Will provide referral to family practice.  Diarrhea/loose stools potentially viral in nature and likely self limited illness.  Reviewed supportive care and dietary guidance.    Reviewed symptomatic management.  OTC meds reviewed.  Anticipatory guidance.  Advised recheck with PCP or return to ER as needed.  Strict return precautions outlined.  Patient voiced understanding and had no further questions.    Please refer to above ER course for further details/discussion.      Problems Addressed:  Hypokalemia: acute illness or injury     Details: Orally repleted  Microscopic hematuria: acute illness or injury     Details: On microscopy, RBC 0-1, therefore likely insignificant.  Recommended outpatient follow up.  Uncontrolled hypertension: chronic illness or injury with severe exacerbation, progression, or side effects of treatment    Amount and/or Complexity of Data Reviewed  External Data Reviewed: labs, ECG and notes.  Labs: ordered. Decision-making details documented in ED Course.  Radiology: ordered and independent interpretation performed. Decision-making details documented in ED Course.  ECG/medicine tests: ordered and independent interpretation performed. Decision-making details documented in ED Course.    Risk  OTC drugs.  Prescription drug management.  Decision regarding hospitalization.                 Disposition  Final diagnoses:   Uncontrolled hypertension   Hypokalemia   Microscopic hematuria     Time reflects when diagnosis was documented in both MDM as applicable and the Disposition within  this note       Time User Action Codes Description Comment    7/9/2024  3:10 PM Casie Trevino [I10] Uncontrolled hypertension     7/9/2024  3:11 PM Casie Trevino [E87.6] Hypokalemia     7/9/2024  3:57 PM Casie Trevino [R31.29] Microscopic hematuria           ED Disposition       ED Disposition   Discharge    Condition   Stable    Date/Time   Tue Jul 9, 2024 1555    Comment   Jojo Hetal discharge to home/self care.                   Follow-up Information       Follow up With Specialties Details Why Contact Info Additional Information    [facility] Schedule an appointment as soon as possible for a visit   [address] [place] [id number]  [phone] [facility], [address] [place] [address], [address], [phone]    [facility] Emergency Medicine  As needed [address] [facility] [address]  [phone] [facility], [address], [place], [address]            Discharge Medication List as of 7/9/2024  3:58 PM        CONTINUE these medications which have CHANGED    Details   amLODIPine (NORVASC) 10 mg tablet Take 1 tablet (10 mg total) by mouth daily, Starting Tue 7/9/2024, Normal      metoprolol tartrate (LOPRESSOR) 50 mg tablet Take 1 tablet (50 mg total) by mouth every 12 (twelve) hours, Starting Tue 7/9/2024, Normal           CONTINUE these medications which have NOT CHANGED    Details   aspirin (ECOTRIN LOW STRENGTH) 81 mg EC tablet Take 1 tablet by mouth daily, Starting Tue 12/5/2017, Normal      FLUoxetine (PROzac) 20 MG tablet Take 20 mg by mouth daily, Historical Med      ondansetron (ZOFRAN-ODT) 4 mg disintegrating tablet Take 1 tablet (4 mg total) by mouth every 6 (six) hours as needed for nausea or vomiting, Starting u 3/25/2021, Normal                 PDMP Review       None            ED  Provider  Electronically Signed by             Casie Trevino PA-C  07/09/24 [id number]

## 2024-07-09 NOTE — DISCHARGE INSTRUCTIONS
Continue to monitor your blood pressure.    Resume your blood pressure medications.  Follow up PCP for re-evaluation.  Return to ER as needed.

## 2024-07-09 NOTE — Clinical Note
Jojo Fong was seen and treated in our emergency department on 7/9/2024.                Diagnosis:     Jojo  .    She may return on this date: 07/11/2024         If you have any questions or concerns, please don't hesitate to call.      Casie Trevino PA-C    ______________________________           _______________          _______________  Hospital Representative                              Date                                Time

## 2024-07-09 NOTE — PROGRESS NOTES
[facility]        NAME: Jojo Fong is a 64 y.o. female  : 1960    MRN: [id number]  DATE: 2024  TIME: 11:27 AM    Assessment and Plan   Hypertensive urgency [I16.0]  1. Hypertensive urgency  Transfer to other facility      2. Loose stools          Spoke with the ER in regard to transfer of patient.    Patient Instructions     Go directly to the ER for further evaluation    Follow up with PCP in 3-5 days.  Proceed to  ER if symptoms worsen.    If tests have been performed at [facility], our office will contact you with results if changes need to be made to the care plan discussed with you at the visit.  You can review your full results on [facility].    Chief Complaint     Chief Complaint   Patient presents with   • Chills   • Diarrhea         History of Present Illness       Patient presents with 2 loose stools a day which started yesterday.  She took 2 doses of Imodium.  She had 1 dose of loose stool so far today.  Denies abdominal pain, nausea or vomiting.  More importantly, she stopped her blood pressure medications during COVID because her PCP closed and she failed to follow-up with another provider.  Initial blood pressure 230/128 and repeat pressure 222/120.  Patient denies chest pain shortness of breath or headache.        Review of Systems   Review of Systems   Constitutional:  Negative for fever.   Respiratory:  Negative for shortness of breath.    Cardiovascular:  Negative for chest pain.   Gastrointestinal:  Positive for diarrhea (loose, twice daily). Negative for nausea and vomiting.   Musculoskeletal:  Negative for myalgias.   Neurological:  Negative for dizziness, weakness and headaches.         Current Medications       Current Outpatient Medications:   •  amLODIPine (NORVASC) 10 mg tablet, Take 1 tablet by mouth daily (Patient not taking: Reported on 2022 ), Disp: 30 tablet, Rfl: 0  •  aspirin (ECOTRIN LOW STRENGTH) 81 mg EC tablet, Take 1 tablet by mouth  daily (Patient not taking: Reported on 1/24/2022 ), Disp: 30 tablet, Rfl: 0  •  FLUoxetine (PROzac) 20 MG tablet, Take 20 mg by mouth daily (Patient not taking: Reported on 1/24/2022 ), Disp: , Rfl:   •  methylPREDNISolone 4 MG tablet therapy pack, Use as directed on package (Patient not taking: Reported on 3/25/2021), Disp: 1 each, Rfl: 0  •  metoprolol tartrate (LOPRESSOR) 50 mg tablet, Take 1 tablet by mouth every 12 (twelve) hours (Patient not taking: Reported on 1/24/2022 ), Disp: 60 tablet, Rfl: 0  •  ondansetron (ZOFRAN-ODT) 4 mg disintegrating tablet, Take 1 tablet (4 mg total) by mouth every 6 (six) hours as needed for nausea or vomiting (Patient not taking: Reported on 1/24/2022 ), Disp: 20 tablet, Rfl: 0  •  predniSONE 50 mg tablet, Take 1 tablet (50 mg total) by mouth daily, Disp: 5 tablet, Rfl: 0    Current Allergies     Allergies as of 07/09/2024   • (No Known Allergies)            The following portions of the patient's history were reviewed and updated as appropriate: allergies, current medications, past family history, past medical history, past social history, past surgical history and problem list.     Past Medical History:   Diagnosis Date   • Hypertension        History reviewed. No pertinent surgical history.    History reviewed. No pertinent family history.      Medications have been verified.        Objective   BP (!) 230/128   Pulse 86   Temp 98.6 °F (37 °C)   Resp 18   SpO2 97%   No LMP recorded. Patient is postmenopausal.       Physical Exam     Physical Exam  Vitals and nursing note reviewed.   Constitutional:       Appearance: Normal appearance.   HENT:      Head: Normocephalic and atraumatic.   Cardiovascular:      Rate and Rhythm: Normal rate and regular rhythm.      Heart sounds: Normal heart sounds.   Pulmonary:      Effort: Pulmonary effort is normal.      Breath sounds: Normal breath sounds.   Abdominal:      General: Abdomen is flat.      Tenderness: There is no abdominal  tenderness.   Skin:     General: Skin is warm.   Neurological:      Mental Status: She is alert.

## 2024-07-11 LAB
ATRIAL RATE: 83 BPM
P AXIS: 38 DEGREES
PR INTERVAL: 108 MS
QRS AXIS: 57 DEGREES
QRSD INTERVAL: 70 MS
QT INTERVAL: 474 MS
QTC INTERVAL: 556 MS
T WAVE AXIS: -28 DEGREES
VENTRICULAR RATE: 83 BPM

## 2024-07-11 PROCEDURE — 93010 ELECTROCARDIOGRAM REPORT: CPT | Performed by: INTERNAL MEDICINE

## 2024-07-19 ENCOUNTER — OFFICE VISIT (OUTPATIENT)
Dept: FAMILY MEDICINE CLINIC | Facility: CLINIC | Age: 64
End: 2024-07-19
Payer: COMMERCIAL

## 2024-07-19 VITALS
BODY MASS INDEX: 18.81 KG/M2 | HEIGHT: 58 IN | OXYGEN SATURATION: 98 % | TEMPERATURE: 97.6 F | HEART RATE: 64 BPM | WEIGHT: 89.6 LBS | SYSTOLIC BLOOD PRESSURE: 116 MMHG | DIASTOLIC BLOOD PRESSURE: 80 MMHG

## 2024-07-19 DIAGNOSIS — R20.0 NUMBNESS OF LEFT FOOT: ICD-10-CM

## 2024-07-19 DIAGNOSIS — Z11.4 SCREENING FOR HIV (HUMAN IMMUNODEFICIENCY VIRUS): ICD-10-CM

## 2024-07-19 DIAGNOSIS — Z13.1 SCREENING FOR DIABETES MELLITUS (DM): ICD-10-CM

## 2024-07-19 DIAGNOSIS — F32.4 MAJOR DEPRESSIVE DISORDER IN PARTIAL REMISSION, UNSPECIFIED WHETHER RECURRENT (HCC): ICD-10-CM

## 2024-07-19 DIAGNOSIS — Z12.11 SCREENING FOR COLORECTAL CANCER: ICD-10-CM

## 2024-07-19 DIAGNOSIS — Z12.12 SCREENING FOR COLORECTAL CANCER: ICD-10-CM

## 2024-07-19 DIAGNOSIS — I10 PRIMARY HYPERTENSION: Primary | ICD-10-CM

## 2024-07-19 DIAGNOSIS — Z11.59 NEED FOR HEPATITIS C SCREENING TEST: ICD-10-CM

## 2024-07-19 DIAGNOSIS — Z12.31 ENCOUNTER FOR SCREENING MAMMOGRAM FOR MALIGNANT NEOPLASM OF BREAST: ICD-10-CM

## 2024-07-19 DIAGNOSIS — Z12.2 SCREENING FOR LUNG CANCER: ICD-10-CM

## 2024-07-19 PROBLEM — Z91.199 MEDICALLY NONCOMPLIANT: Chronic | Status: RESOLVED | Noted: 2017-12-02 | Resolved: 2024-07-19

## 2024-07-19 PROCEDURE — 99204 OFFICE O/P NEW MOD 45 MIN: CPT | Performed by: PHYSICIAN ASSISTANT

## 2024-07-19 RX ORDER — METOPROLOL TARTRATE 50 MG/1
50 TABLET, FILM COATED ORAL EVERY 12 HOURS SCHEDULED
Qty: 120 TABLET | Refills: 1 | Status: SHIPPED | OUTPATIENT
Start: 2024-07-19

## 2024-07-19 RX ORDER — ASPIRIN 81 MG/1
81 TABLET ORAL DAILY
Qty: 30 TABLET | Refills: 0 | Status: CANCELLED | OUTPATIENT
Start: 2024-07-19

## 2024-07-19 RX ORDER — FLUOXETINE 20 MG/1
20 TABLET, FILM COATED ORAL DAILY
Qty: 30 TABLET | Refills: 2 | Status: SHIPPED | OUTPATIENT
Start: 2024-07-19 | End: 2024-07-19

## 2024-07-19 RX ORDER — FLUOXETINE HYDROCHLORIDE 20 MG/1
20 CAPSULE ORAL DAILY
Qty: 30 CAPSULE | Refills: 2 | Status: SHIPPED | OUTPATIENT
Start: 2024-07-19

## 2024-07-19 RX ORDER — AMLODIPINE BESYLATE 10 MG/1
10 TABLET ORAL DAILY
Qty: 60 TABLET | Refills: 1 | Status: SHIPPED | OUTPATIENT
Start: 2024-07-19

## 2024-07-19 NOTE — PROGRESS NOTES
Ambulatory Visit  Name: Jojo Fong      : 1960      MRN: [id number]  Encounter Provider: Heri Land PA-C  Encounter Date: 2024   Encounter department: [facility] PRIMARY CARE    Assessment & Plan   1. Primary hypertension  -     Lipid panel; Future  -     amLODIPine (NORVASC) 10 mg tablet; Take 1 tablet (10 mg total) by mouth daily  -     metoprolol tartrate (LOPRESSOR) 50 mg tablet; Take 1 tablet (50 mg total) by mouth every 12 (twelve) hours  2. Major depressive disorder in partial remission, unspecified whether recurrent (HCC)  -     FLUoxetine 20 MG tablet; Take 1 tablet (20 mg total) by mouth daily  - Patient reported longstanding history of depression. Was on fluoxetine for many years until the pandemic hit and her PCP closed office. Denies current HI/SI, auditory/visual hallucinations. She notes that she previously had significant improvement to mood and anxiety when on fluoxetine in past and would like to begin again today. PCP in agreement with plan and prescribed. Pt warned that it can take 6-8 weeks to notice effect and provided  on other potential side effects.   3. Numbness of left foot  -     EMG 2 limb lower extremity; Future  - Patient reports one week history of numbness sensation left foot. She is unable to associate mechanism as she says it started spontaneously. She denies pain to the area or difficulty ambulation. No other areas of numbness or tingling noted. ROS negative. Physical exam showed normal proprioception, monofilament testing, and vibratory testing. No skin abnormalities. Pulses and reflexes +2. PCP did note decreased range of motion of the toes of left feet. PCP placed orders for EMG to assess for underlying nerve conduction abnormality.   4. Screening for colorectal cancer  -     Ambulatory Referral to Gastroenterology; Future  5. Encounter for screening mammogram for malignant neoplasm of breast  -     Mammo screening bilateral w 3d &  cad; Future  6. Screening for diabetes mellitus (DM)  -     Ambulatory Referral to Family Practice  -     Hemoglobin A1C; Future  7. Screening for lung cancer  -     CT lung screening program; Future; Expected date: 07/19/2024  8. Screening for HIV (human immunodeficiency virus)  -     HIV 1/2 AG/AB w Reflex [facility] for 2 yr old and above; Future  9. Need for hepatitis C screening test  -     Hepatitis C antibody; Future    Patient is a 64 year old female PMH depression, hypertension presenting to clinic today to establish care. Patient reported that it was more than 5 years since she had cervical and breast cancer screenings. Has never had colorectal cancer screening. Orders placed for GI referral/screening mammogram. Pt to schedule in office for cervical cancer screening.     Follow up in two weeks for annual physical.   I discussed with her that she is a candidate for lung cancer CT screening.     The following Shared Decision-Making points were covered:  Benefits of screening were discussed, including the rates of reduction in death from lung cancer and other causes.  Harms of screening were reviewed, including false positive tests, radiation exposure levels, risks of invasive procedures, risks of complications of screening, and risk of overdiagnosis.  I counseled on the importance of adherence to annual lung cancer LDCT screening, impact of co-morbidities, and ability or willingness to undergo diagnosis and treatment.  I counseled on the importance of maintaining abstinence as a former smoker or was counseled on the importance of smoking cessation if a current smoker    Review of Eligibility Criteria: She meets all of the criteria for Lung Cancer Screening.   She is 64 y.o.   She has 20 pack year tobacco history and is a current smoker or has quit within the past 15 years  She presents no signs or symptoms of lung cancer    After discussion, the patient decided to elect lung cancer screening.     History of  "Present Illness     Jojo Fong is a 64 y.o. female PMH of hypertension, depression.     Pt has longstanding history of depression. Pt says that she was on fluoxetine for more than 10 years and while on it it provided significant improvement. Has not been on it \"in years,\" as she states that when the pandemic hit, her previous PCP closed office and she has not re-established since. She is interested in re-starting the fluoxetine today. She says she feels chronically \"on edge\" and often goes through bouts of feeling sad, and less energy to do things. Denies HI/SI, auditory/visual hallucinations.     Pt also reports numbness of left foot and decreased ability to move toes. She says this started one week ago and has been constant since it started. She denies any known mechanism of injury or history of trauma to foot. This has never happened before. No areas of numbness or tingling noted on the rest of her body. She has not tried any treatment.         Review of Systems   Constitutional:  Negative for chills, fatigue and fever.   HENT:  Negative for ear pain and sore throat.    Eyes:  Negative for pain and visual disturbance.   Respiratory:  Negative for cough and shortness of breath.    Cardiovascular:  Negative for chest pain and palpitations.   Gastrointestinal:  Negative for abdominal pain, diarrhea, nausea and vomiting.   Genitourinary:  Negative for dysuria and hematuria.   Musculoskeletal:  Negative for arthralgias and back pain.   Skin:  Negative for color change and rash.   Neurological:  Positive for numbness. Negative for seizures, syncope and headaches.   All other systems reviewed and are negative.      Objective     /80   Pulse 64   Temp 97.6 °F (36.4 °C)   Ht 4' 10.2\" (1.478 m)   Wt 40.6 kg (89 lb 9.6 oz)   SpO2 98%   BMI 18.60 kg/m²     Physical Exam  Vitals reviewed.   Constitutional:       Appearance: Normal appearance.   HENT:      Head: Normocephalic.      Right Ear: External ear " normal.      Left Ear: External ear normal.      Nose: Nose normal.      Mouth/Throat:      Mouth: Mucous membranes are moist.      Pharynx: Oropharynx is clear.   Eyes:      Conjunctiva/sclera: Conjunctivae normal.   Cardiovascular:      Rate and Rhythm: Normal rate and regular rhythm.      Pulses:           Dorsalis pedis pulses are 2+ on the right side and 2+ on the left side.        Posterior tibial pulses are 2+ on the right side and 2+ on the left side.      Heart sounds: Normal heart sounds.   Pulmonary:      Effort: Pulmonary effort is normal.      Breath sounds: Normal breath sounds.   Abdominal:      General: Bowel sounds are normal.      Palpations: Abdomen is soft.   Musculoskeletal:      Right upper leg: No tenderness or bony tenderness.      Left upper leg: No tenderness or bony tenderness.      Right knee: No tenderness.      Left knee: No tenderness.      Left foot: Decreased range of motion.      Comments: Decreased ROM of the toes left foot. Decreased flexion and extension.    Feet:      Right foot:      Protective Sensation: 9 sites tested.  9 sites sensed.      Skin integrity: Skin integrity normal.      Toenail Condition: Right toenails are normal.      Left foot:      Protective Sensation: 9 sites tested.  9 sites sensed.      Skin integrity: Skin integrity normal.      Toenail Condition: Left toenails are normal.      Comments: Vibratory and proprioception testing WNL bilaterally.   Neurological:      Mental Status: She is alert and oriented to person, place, and time.   Psychiatric:         Behavior: Behavior normal.       Administrative Statements

## 2024-07-23 RX ORDER — FLUOXETINE 20 MG/1
20 TABLET, FILM COATED ORAL DAILY
Qty: 30 TABLET | Refills: 2 | OUTPATIENT
Start: 2024-07-23

## 2024-07-25 ENCOUNTER — APPOINTMENT (OUTPATIENT)
Dept: LAB | Facility: MEDICAL CENTER | Age: 64
End: 2024-07-25
Payer: COMMERCIAL

## 2024-07-25 DIAGNOSIS — Z13.1 SCREENING FOR DIABETES MELLITUS (DM): ICD-10-CM

## 2024-07-25 DIAGNOSIS — Z11.59 NEED FOR HEPATITIS C SCREENING TEST: ICD-10-CM

## 2024-07-25 DIAGNOSIS — Z11.4 SCREENING FOR HIV (HUMAN IMMUNODEFICIENCY VIRUS): ICD-10-CM

## 2024-07-25 LAB
EST. AVERAGE GLUCOSE BLD GHB EST-MCNC: 114 MG/DL
HBA1C MFR BLD: 5.6 %
HCV AB SER QL: NORMAL
HIV 1+2 AB+HIV1 P24 AG SERPL QL IA: NORMAL
HIV 2 AB SERPL QL IA: NORMAL
HIV1 AB SERPL QL IA: NORMAL
HIV1 P24 AG SERPL QL IA: NORMAL

## 2024-07-25 PROCEDURE — 87389 HIV-1 AG W/HIV-1&-2 AB AG IA: CPT

## 2024-07-25 PROCEDURE — 86803 HEPATITIS C AB TEST: CPT

## 2024-07-25 PROCEDURE — 83036 HEMOGLOBIN GLYCOSYLATED A1C: CPT

## 2024-07-25 PROCEDURE — 36415 COLL VENOUS BLD VENIPUNCTURE: CPT

## 2024-07-29 ENCOUNTER — TELEPHONE (OUTPATIENT)
Dept: FAMILY MEDICINE CLINIC | Facility: CLINIC | Age: 64
End: 2024-07-29

## 2024-07-29 NOTE — TELEPHONE ENCOUNTER
Called patient to relay test results below. Patient did not answer, left voicemail to call the office at [phone].

## 2024-07-29 NOTE — TELEPHONE ENCOUNTER
----- Message from Heri Land PA-C sent at 7/29/2024  9:39 AM EDT -----  Hi Ladies,     Please call patient to inform that A1c is normal. HIV/Hep C negative.     Thank you !   Heri Land PA-C

## 2024-07-31 ENCOUNTER — TELEPHONE (OUTPATIENT)
Dept: FAMILY MEDICINE CLINIC | Facility: CLINIC | Age: 64
End: 2024-07-31

## 2024-07-31 NOTE — TELEPHONE ENCOUNTER
Called patient to relay message. Patient's sister answered the phone. Advised to have Valentina call the office to relay a message from Heri Land PA-C. Please see message below. You may relay to the patient.     Thank you    [facility]  Hi Ladies,    Please call patient to inform that A1c is normal. HIV/Hep C negative.    Thank you !  Heri Land PA-C

## 2024-08-05 ENCOUNTER — TELEPHONE (OUTPATIENT)
Age: 64
End: 2024-08-05

## 2024-08-05 NOTE — TELEPHONE ENCOUNTER
08/05/24  Screened by: Shobha Mccabe    Referring Provider PCP    Pre- Screening:     There is no height or weight on file to calculate BMI. BMI - 18.60  Has patient been referred for a routine screening Colonoscopy? yes  Is the patient between 45-75 years old? yes      Previous Colonoscopy no   If yes:    Date:     Facility:     Reason:         Does the patient want to see a Gastroenterologist prior to their procedure OR are they having any GI symptoms? no    Has the patient been hospitalized or had abdominal surgery in the past 6 months? no    Does the patient use supplemental oxygen? no    Does the patient take Coumadin, Lovenox, Plavix, Elliquis, Xarelto, or other blood thinning medication? no    Has the patient had a stroke, cardiac event, or stent placed in the past year? no      If patient is between 45yrs - 49yrs, please advise patient that we will have to confirm benefits & coverage with their insurance company for a routine screening colonoscopy.

## 2024-08-05 NOTE — TELEPHONE ENCOUNTER
Scheduled date of colonoscopy (as of today): Thursday 9/5/2024  Physician performing colonoscopy: Dr. Stewart  Location of colonoscopy: [facility]  Bowel prep reviewed with patient: MIRALAX / DULCOLAX  Instructions reviewed with patient by:Holland ONEAL - Message  sent to office staff to forward  Clearances: N/A   Secondary Intention Text (Leave Blank If You Do Not Want): The defect will heal with secondary intention.

## 2024-08-12 NOTE — PATIENT INSTRUCTIONS
"Patient Education     Routine physical for adults   The Basics   Written by the doctors and editors at [facility]   What is a physical? -- A physical is a routine visit, or \"check-up,\" with your doctor. You might also hear it called a \"wellness visit\" or \"preventive visit.\"  During each visit, the doctor will:   Ask about your physical and mental health   Ask about your habits, behaviors, and lifestyle   Do an exam   Give you vaccines if needed   Talk to you about any medicines you take   Give advice about your health   Answer your questions  Getting regular check-ups is an important part of taking care of your health. It can help your doctor find and treat any problems you have. But it's also important for preventing health problems.  A routine physical is different from a \"sick visit.\" A sick visit is when you see a doctor because of a health concern or problem. Since physicals are scheduled ahead of time, you can think about what you want to ask the doctor.  How often should I get a physical? -- It depends on your age and health. In general, for people age 21 years and older:   If you are younger than 50 years, you might be able to get a physical every 3 years.   If you are 50 years or older, your doctor might recommend a physical every year.  If you have an ongoing health condition, like diabetes or high blood pressure, your doctor will probably want to see you more often.  What happens during a physical? -- In general, each visit will include:   Physical exam - The doctor or nurse will check your height, weight, heart rate, and blood pressure. They will also look at your eyes and ears. They will ask about how you are feeling and whether you have any symptoms that bother you.   Medicines - It's a good idea to bring a list of all the medicines you take to each doctor visit. Your doctor will talk to you about your medicines and answer any questions. Tell them if you are having any side effects that bother you. You " "should also tell them if you are having trouble paying for any of your medicines.   Habits and behaviors - This includes:   Your diet   Your exercise habits   Whether you smoke, drink alcohol, or use drugs   Whether you are sexually active   Whether you feel safe at home  Your doctor will talk to you about things you can do to improve your health and lower your risk of health problems. They will also offer help and support. For example, if you want to quit smoking, they can give you advice and might prescribe medicines. If you want to improve your diet or get more physical activity, they can help you with this, too.   Lab tests, if needed - The tests you get will depend on your age and situation. For example, your doctor might want to check your:   Cholesterol   Blood sugar   Iron level   Vaccines - The recommended vaccines will depend on your age, health, and what vaccines you already had. Vaccines are very important because they can prevent certain serious or deadly infections.   Discussion of screening - \"Screening\" means checking for diseases or other health problems before they cause symptoms. Your doctor can recommend screening based on your age, risk, and preferences. This might include tests to check for:   Cancer, such as breast, prostate, cervical, ovarian, colorectal, prostate, lung, or skin cancer   Sexually transmitted infections, such as chlamydia and gonorrhea   Mental health conditions like depression and anxiety  Your doctor will talk to you about the different types of screening tests. They can help you decide which screenings to have. They can also explain what the results might mean.   Answering questions - The physical is a good time to ask the doctor or nurse questions about your health. If needed, they can refer you to other doctors or specialists, too.  Adults older than 65 years often need other care, too. As you get older, your doctor will talk to you about:   How to prevent falling at " home   Hearing or vision tests   Memory testing   How to take your medicines safely   Making sure that you have the help and support you need at home  All topics are updated as new evidence becomes available and our peer review process is complete.  This topic retrieved from [employer] on: May 02, 2024.  Topic 839271 Version 1.0  Release: 32.4.3 - C32.122  © 2024 UpToDate, Inc. and/or its affiliates. All rights reserved.  Consumer Information Use and Disclaimer   Disclaimer: This generalized information is a limited summary of diagnosis, treatment, and/or medication information. It is not meant to be comprehensive and should be used as a tool to help the user understand and/or assess potential diagnostic and treatment options. It does NOT include all information about conditions, treatments, medications, side effects, or risks that may apply to a specific patient. It is not intended to be medical advice or a substitute for the medical advice, diagnosis, or treatment of a health care provider based on the health care provider's examination and assessment of a patient's specific and unique circumstances. Patients must speak with a health care provider for complete information about their health, medical questions, and treatment options, including any risks or benefits regarding use of medications. This information does not endorse any treatments or medications as safe, effective, or approved for treating a specific patient. UpToDate, Inc. and its affiliates disclaim any warranty or liability relating to this information or the use thereof.The use of this information is governed by the Terms of Use, available at https://www.woltersSonexis Technologyuwer.com/en/know/clinical-effectiveness-terms. 2024© UpToDate, Inc. and its affiliates and/or licensors. All rights reserved.  Copyright   © 2024 UpToDate, Inc. and/or its affiliates. All rights reserved.    Patient Education     Routine physical for adults   The Basics   Written by the  "doctors and editors at [facility]   What is a physical? -- A physical is a routine visit, or \"check-up,\" with your doctor. You might also hear it called a \"wellness visit\" or \"preventive visit.\"  During each visit, the doctor will:   Ask about your physical and mental health   Ask about your habits, behaviors, and lifestyle   Do an exam   Give you vaccines if needed   Talk to you about any medicines you take   Give advice about your health   Answer your questions  Getting regular check-ups is an important part of taking care of your health. It can help your doctor find and treat any problems you have. But it's also important for preventing health problems.  A routine physical is different from a \"sick visit.\" A sick visit is when you see a doctor because of a health concern or problem. Since physicals are scheduled ahead of time, you can think about what you want to ask the doctor.  How often should I get a physical? -- It depends on your age and health. In general, for people age 21 years and older:   If you are younger than 50 years, you might be able to get a physical every 3 years.   If you are 50 years or older, your doctor might recommend a physical every year.  If you have an ongoing health condition, like diabetes or high blood pressure, your doctor will probably want to see you more often.  What happens during a physical? -- In general, each visit will include:   Physical exam - The doctor or nurse will check your height, weight, heart rate, and blood pressure. They will also look at your eyes and ears. They will ask about how you are feeling and whether you have any symptoms that bother you.   Medicines - It's a good idea to bring a list of all the medicines you take to each doctor visit. Your doctor will talk to you about your medicines and answer any questions. Tell them if you are having any side effects that bother you. You should also tell them if you are having trouble paying for any of your " "medicines.   Habits and behaviors - This includes:   Your diet   Your exercise habits   Whether you smoke, drink alcohol, or use drugs   Whether you are sexually active   Whether you feel safe at home  Your doctor will talk to you about things you can do to improve your health and lower your risk of health problems. They will also offer help and support. For example, if you want to quit smoking, they can give you advice and might prescribe medicines. If you want to improve your diet or get more physical activity, they can help you with this, too.   Lab tests, if needed - The tests you get will depend on your age and situation. For example, your doctor might want to check your:   Cholesterol   Blood sugar   Iron level   Vaccines - The recommended vaccines will depend on your age, health, and what vaccines you already had. Vaccines are very important because they can prevent certain serious or deadly infections.   Discussion of screening - \"Screening\" means checking for diseases or other health problems before they cause symptoms. Your doctor can recommend screening based on your age, risk, and preferences. This might include tests to check for:   Cancer, such as breast, prostate, cervical, ovarian, colorectal, prostate, lung, or skin cancer   Sexually transmitted infections, such as chlamydia and gonorrhea   Mental health conditions like depression and anxiety  Your doctor will talk to you about the different types of screening tests. They can help you decide which screenings to have. They can also explain what the results might mean.   Answering questions - The physical is a good time to ask the doctor or nurse questions about your health. If needed, they can refer you to other doctors or specialists, too.  Adults older than 65 years often need other care, too. As you get older, your doctor will talk to you about:   How to prevent falling at home   Hearing or vision tests   Memory testing   How to take your " medicines safely   Making sure that you have the help and support you need at home  All topics are updated as new evidence becomes available and our peer review process is complete.  This topic retrieved from [employer] on: May 02, 2024.  Topic 405608 Version 1.0  Release: 32.4.3 - C32.122  © 2024 UpToDate, Inc. and/or its affiliates. All rights reserved.  Consumer Information Use and Disclaimer   Disclaimer: This generalized information is a limited summary of diagnosis, treatment, and/or medication information. It is not meant to be comprehensive and should be used as a tool to help the user understand and/or assess potential diagnostic and treatment options. It does NOT include all information about conditions, treatments, medications, side effects, or risks that may apply to a specific patient. It is not intended to be medical advice or a substitute for the medical advice, diagnosis, or treatment of a health care provider based on the health care provider's examination and assessment of a patient's specific and unique circumstances. Patients must speak with a health care provider for complete information about their health, medical questions, and treatment options, including any risks or benefits regarding use of medications. This information does not endorse any treatments or medications as safe, effective, or approved for treating a specific patient. UpToDate, Inc. and its affiliates disclaim any warranty or liability relating to this information or the use thereof.The use of this information is governed by the Terms of Use, available at https://www.woltersPersonallyuwer.com/en/know/clinical-effectiveness-terms. 2024© UpToDate, Inc. and its affiliates and/or licensors. All rights reserved.  Copyright   © 2024 UpToDate, Inc. and/or its affiliates. All rights reserved.

## 2024-08-12 NOTE — PROGRESS NOTES
Adult Annual Physical  Name: Jojo Fong      : 1960      MRN: [id number]  Encounter Provider: Maya Roche MD  Encounter Date: 2024   Encounter department: [facility] PRIMARY CARE    Assessment & Plan   1. Annual physical exam  2. Primary hypertension  Comments:  BP- 160/80  Patient currently on amlodipine 10 mg daily, metoprolol 50 mg twice daily  Advised patient to keep a BP log  Follow-up in 3 weeks  3. Major depressive disorder in full remission, unspecified whether recurrent (HCC)  Comments:  Maintained on Prozac 20 mg daily  4. Family history of breast cancer in mother  Comments:  Insisted that patient get mammogram as soon as possible  Breast mass felt in the right upper outer quadrant of right breast, axillary lymph node positive  Mammogram order placed last visit  We will await mammogram results  5. Macrocytosis  Comments:  Check B12, folate  Orders:  -     Vitamin B12/Folate, Serum Panel; Future  6. Encounter for immunization  -     Zoster Vac Recomb Adjuvanted (Shingrix) 50 MCG/0.5ML SUSR; Inject 0.5 mL into a muscle once for 1 dose Repeat dose in 2 to 6 months  7. Stage 2 chronic kidney disease  Comments:  Stable for age  Continue to monitor kidney function  8. Encounter for Papanicolaou smear for cervical cancer screening  -     Liquid-based pap, screening; Future    Immunizations and preventive care screenings were discussed with patient today. Appropriate education was printed on patient's after visit summary.    Counseling:  Alcohol/drug use: discussed moderation in alcohol intake, the recommendations for healthy alcohol use, and avoidance of illicit drug use.  Dental Health: discussed importance of regular tooth brushing, flossing, and dental visits.  Sexual health: discussed sexually transmitted diseases, partner selection, use of condoms, avoidance of unintended pregnancy, and contraceptive alternatives.  Exercise: the importance of regular exercise/physical  "activity was discussed. Recommend exercise 3-5 times per week for at least 30 minutes.          History of Present Illness     Adult Annual Physical:  Patient presents for annual physical. Patient denies any acute concerns today. Denies fevers, chills, chest pain, sob, abdominal pain, n/v. .     Diet and Physical Activity:  - Diet/Nutrition: poor diet and frequent junk food.  - Exercise: no formal exercise.    General Health:  - Sleep: sleeps well and 7-8 hours of sleep on average.  - Hearing: normal hearing bilateral ears.  - Vision: wears glasses, most recent eye exam > 1 year ago and vision problems. near sighted  - Dental: regular dental visits and brushes teeth twice daily.    /GYN Health:  - Follows with GYN: no.   - Menopause: postmenopausal.   - History of STDs: no    Advanced Care Planning:  - Has an advanced directive?: no    - Has a durable medical POA?: no    - ACP document given to patient?: no      Review of Systems   Constitutional:  Negative for chills and fever.   HENT:  Negative for ear pain and sore throat.    Eyes:  Negative for pain and visual disturbance.   Respiratory:  Negative for cough and shortness of breath.    Cardiovascular:  Negative for chest pain and palpitations.   Gastrointestinal:  Negative for abdominal pain and vomiting.   Genitourinary:  Negative for dysuria and hematuria.   Musculoskeletal:  Negative for arthralgias and back pain.   Skin:  Negative for color change and rash.   Neurological:  Negative for seizures and syncope.   All other systems reviewed and are negative.    Medical History Reviewed by provider this encounter:  Tobacco  Allergies  Meds  Problems  Med Hx  Surg Hx  Fam Hx         Objective     /80   Pulse 61   Temp 97.5 °F (36.4 °C)   Ht 4' 10.2\" (1.478 m)   Wt 40.3 kg (88 lb 12.8 oz)   SpO2 98%   BMI 18.43 kg/m²     Physical Exam  Vitals and nursing note reviewed. Exam conducted with a chaperone present.   Constitutional:       General: She " is not in acute distress.     Appearance: She is well-developed.   HENT:      Head: Normocephalic and atraumatic.      Right Ear: Tympanic membrane normal.      Left Ear: Tympanic membrane normal.      Nose: Nose normal.      Mouth/Throat:      Mouth: Mucous membranes are moist.      Pharynx: Oropharynx is clear.   Eyes:      Conjunctiva/sclera: Conjunctivae normal.   Cardiovascular:      Rate and Rhythm: Normal rate and regular rhythm.      Heart sounds: No murmur heard.  Pulmonary:      Effort: Pulmonary effort is normal. No respiratory distress.      Breath sounds: Normal breath sounds.   Chest:   Breasts:     Breasts are symmetrical.      Right: Mass present. No swelling, bleeding, inverted nipple, nipple discharge, skin change or tenderness.      Left: No swelling, bleeding, inverted nipple, mass, nipple discharge, skin change or tenderness.   Abdominal:      Palpations: Abdomen is soft.      Tenderness: There is no abdominal tenderness.   Genitourinary:     Pubic Area: No rash.       Labia:         Right: No rash, tenderness, lesion or injury.         Left: No rash, tenderness, lesion or injury.       Vagina: No vaginal discharge, erythema, tenderness, bleeding, lesions or prolapsed vaginal walls.      Cervix: No discharge, lesion, erythema or cervical bleeding.      Adnexa:         Right: No tenderness.          Left: No tenderness.     Musculoskeletal:         General: No swelling.      Cervical back: Neck supple.   Lymphadenopathy:      Cervical: Cervical adenopathy present.      Upper Body:      Right upper body: Axillary adenopathy present.      Left upper body: No axillary adenopathy.   Skin:     General: Skin is warm and dry.      Capillary Refill: Capillary refill takes less than 2 seconds.   Neurological:      Mental Status: She is alert and oriented to person, place, and time.   Psychiatric:         Mood and Affect: Mood normal.       Administrative Statements   I have spent a total time of 45  minutes in caring for this patient on the day of the visit/encounter including Diagnostic results, Risks and benefits of tx options, Importance of tx compliance, Counseling / Coordination of care, Reviewing / ordering tests, medicine, procedures  , and Obtaining or reviewing history  .

## 2024-08-13 ENCOUNTER — OFFICE VISIT (OUTPATIENT)
Dept: FAMILY MEDICINE CLINIC | Facility: CLINIC | Age: 64
End: 2024-08-13
Payer: COMMERCIAL

## 2024-08-13 VITALS
TEMPERATURE: 97.5 F | WEIGHT: 88.8 LBS | BODY MASS INDEX: 18.64 KG/M2 | SYSTOLIC BLOOD PRESSURE: 160 MMHG | HEART RATE: 61 BPM | OXYGEN SATURATION: 98 % | HEIGHT: 58 IN | DIASTOLIC BLOOD PRESSURE: 80 MMHG

## 2024-08-13 DIAGNOSIS — D75.89 MACROCYTOSIS: ICD-10-CM

## 2024-08-13 DIAGNOSIS — N18.2 STAGE 2 CHRONIC KIDNEY DISEASE: ICD-10-CM

## 2024-08-13 DIAGNOSIS — Z80.3 FAMILY HISTORY OF BREAST CANCER IN MOTHER: ICD-10-CM

## 2024-08-13 DIAGNOSIS — Z00.00 ANNUAL PHYSICAL EXAM: Primary | ICD-10-CM

## 2024-08-13 DIAGNOSIS — F32.5 MAJOR DEPRESSIVE DISORDER IN FULL REMISSION, UNSPECIFIED WHETHER RECURRENT (HCC): Chronic | ICD-10-CM

## 2024-08-13 DIAGNOSIS — Z12.4 ENCOUNTER FOR PAPANICOLAOU SMEAR FOR CERVICAL CANCER SCREENING: ICD-10-CM

## 2024-08-13 DIAGNOSIS — I10 PRIMARY HYPERTENSION: ICD-10-CM

## 2024-08-13 DIAGNOSIS — Z23 ENCOUNTER FOR IMMUNIZATION: ICD-10-CM

## 2024-08-13 DIAGNOSIS — F32.4 MAJOR DEPRESSIVE DISORDER IN PARTIAL REMISSION, UNSPECIFIED WHETHER RECURRENT (HCC): ICD-10-CM

## 2024-08-13 PROCEDURE — 99396 PREV VISIT EST AGE 40-64: CPT | Performed by: FAMILY MEDICINE

## 2024-08-13 PROCEDURE — 99214 OFFICE O/P EST MOD 30 MIN: CPT | Performed by: FAMILY MEDICINE

## 2024-08-13 PROCEDURE — G0476 HPV COMBO ASSAY CA SCREEN: HCPCS

## 2024-08-13 PROCEDURE — G0145 SCR C/V CYTO,THINLAYER,RESCR: HCPCS

## 2024-08-13 RX ORDER — ZOSTER VACCINE RECOMBINANT, ADJUVANTED 50 MCG/0.5
0.5 KIT INTRAMUSCULAR ONCE
Qty: 1 EACH | Refills: 1 | Status: SHIPPED | OUTPATIENT
Start: 2024-08-13 | End: 2024-08-13

## 2024-08-14 LAB
HPV HR 12 DNA CVX QL NAA+PROBE: NEGATIVE
HPV16 DNA CVX QL NAA+PROBE: NEGATIVE
HPV18 DNA CVX QL NAA+PROBE: NEGATIVE

## 2024-08-15 ENCOUNTER — TELEPHONE (OUTPATIENT)
Dept: FAMILY MEDICINE CLINIC | Facility: CLINIC | Age: 64
End: 2024-08-15

## 2024-08-15 NOTE — TELEPHONE ENCOUNTER
----- Message from Maya Roche MD sent at 8/14/2024  5:39 PM EDT -----  [facility] team,  Please inform patient that her HPV results are negative.  We are awaiting her Pap results, we will call once that results.    Thank you!

## 2024-08-15 NOTE — TELEPHONE ENCOUNTER
I called patient and sister answered and stated Patient doesn't get home until around 4:45 PM. I stated I will attempt to call at that time.

## 2024-08-16 ENCOUNTER — TELEPHONE (OUTPATIENT)
Dept: FAMILY MEDICINE CLINIC | Facility: CLINIC | Age: 64
End: 2024-08-16

## 2024-08-16 NOTE — TELEPHONE ENCOUNTER
----- Message from Maya Roche MD sent at 8/14/2024  5:39 PM EDT -----  [facility] team,  Please inform patient that her HPV results are negative.  We are awaiting her Pap results, we will call once that results.    Thank you!

## 2024-08-20 ENCOUNTER — TELEPHONE (OUTPATIENT)
Age: 64
End: 2024-08-20

## 2024-08-20 DIAGNOSIS — N63.11 MASS OF UPPER OUTER QUADRANT OF RIGHT BREAST: ICD-10-CM

## 2024-08-20 DIAGNOSIS — Z80.3 FAMILY HISTORY OF BREAST CANCER IN MOTHER: Primary | ICD-10-CM

## 2024-08-20 LAB
LAB AP GYN PRIMARY INTERPRETATION: NORMAL
Lab: NORMAL

## 2024-08-20 NOTE — TELEPHONE ENCOUNTER
Order was printed and faxed over to [employer]. Fax [phone].  Fax was confirmed.

## 2024-08-20 NOTE — TELEPHONE ENCOUNTER
Marylee with [facility] called.  She needs a script faxed to her asap by Dr. Roche.    The script must read bilateral diagnostic mammogram for a right breast lump and right breast limited ultra sound for the lump and right extremity ultra sound.  As soon as the script is in her chart Marylee will call patient to schedule an appointment.  Fax # is [phone].

## 2024-08-23 ENCOUNTER — TELEPHONE (OUTPATIENT)
Dept: INTERNAL MEDICINE CLINIC | Facility: CLINIC | Age: 64
End: 2024-08-23

## 2024-08-23 NOTE — TELEPHONE ENCOUNTER
----- Message from Maya Roche MD sent at 8/21/2024  2:07 PM EDT -----  Hello team,    Please let patient know that her Pap smear is normal.  Negative for malignancy.    Thanks,  Dr. Roche

## 2024-09-03 ENCOUNTER — OFFICE VISIT (OUTPATIENT)
Dept: FAMILY MEDICINE CLINIC | Facility: CLINIC | Age: 64
End: 2024-09-03
Payer: COMMERCIAL

## 2024-09-03 VITALS
TEMPERATURE: 97.6 F | HEIGHT: 58 IN | HEART RATE: 65 BPM | BODY MASS INDEX: 19.23 KG/M2 | WEIGHT: 91.6 LBS | OXYGEN SATURATION: 98 % | DIASTOLIC BLOOD PRESSURE: 72 MMHG | SYSTOLIC BLOOD PRESSURE: 124 MMHG

## 2024-09-03 DIAGNOSIS — N63.11 MASS OF UPPER OUTER QUADRANT OF RIGHT BREAST: ICD-10-CM

## 2024-09-03 DIAGNOSIS — I10 PRIMARY HYPERTENSION: Primary | ICD-10-CM

## 2024-09-03 PROCEDURE — 3078F DIAST BP <80 MM HG: CPT | Performed by: FAMILY MEDICINE

## 2024-09-03 PROCEDURE — 99213 OFFICE O/P EST LOW 20 MIN: CPT | Performed by: FAMILY MEDICINE

## 2024-09-03 PROCEDURE — 3074F SYST BP LT 130 MM HG: CPT | Performed by: FAMILY MEDICINE

## 2024-09-03 NOTE — PROGRESS NOTES
Assessment & Plan     Diagnoses and all orders for this visit:    Primary hypertension  Comments:  BP logs reviewed- SBP predominantly 130-145, DBP-80-90  BP today 124/72  Given age, advised patient to continue current regimen  Amlodipine 10 mg daily  Metoprolol 50 every 12 hrs  Follow-up in 6 months  Low-salt diet    Mass of upper outer quadrant of right breast  Comments:  Mammogram and ultrasound of the right breast reviewed  No evidence of malignancy noted  Follow-up mammogram in 1 year        Subjective     Patient Id: Jojo Fong is a 64 y.o. female    Patient is a 64-year-old female who presents to the office today for follow-up for blood pressure.  Patient brings a log to be reviewed today.  Patient denies chest pain, SOB, abdominal pain, nausea/vomiting.        Review of Systems   Constitutional:  Negative for chills and fever.   HENT:  Negative for ear pain and sore throat.    Eyes:  Negative for pain and visual disturbance.   Respiratory:  Negative for cough and shortness of breath.    Cardiovascular:  Negative for chest pain and palpitations.   Gastrointestinal:  Negative for abdominal pain and vomiting.   Genitourinary:  Negative for dysuria and hematuria.   Musculoskeletal:  Negative for arthralgias and back pain.   Skin:  Negative for color change and rash.   Neurological:  Negative for seizures and syncope.   All other systems reviewed and are negative.      Past Medical History:   Diagnosis Date    Depression     Hypertension        Past Surgical History:   Procedure Laterality Date    TUMOR REMOVAL Right     right ear       Family History   Problem Relation Age of Onset    Breast cancer Mother     Cancer Mother     Lung cancer Father     Liver cancer Father        Social History     Socioeconomic History    Marital status: Single     Spouse name: None    Number of children: None    Years of education: None    Highest education level: None   Occupational History    None   Tobacco Use     "Smoking status: Former     Current packs/day: 0.00     Average packs/day: 1 pack/day for 33.0 years (33.0 ttl pk-yrs)     Types: Cigarettes     Start date: 10/28/1975     Quit date: 10/28/2008     Years since quitting: 15.8     Passive exposure: Past    Smokeless tobacco: Never   Vaping Use    Vaping status: Never Used   Substance and Sexual Activity    Alcohol use: No     Comment: holiday    Drug use: No    Sexual activity: Never   Other Topics Concern    None   Social History Narrative    None     Social Determinants of Health     Financial Resource Strain: Not on file   Food Insecurity: Not on file   Transportation Needs: Not on file   Physical Activity: Not on file   Stress: Not on file   Social Connections: Unknown (6/18/2024)    Received from [facility]     How often do you feel lonely or isolated from those around you? (Adult - for ages 18 years and over): Not on file   Intimate Partner Violence: Not on file   Housing Stability: Not on file       No Known Allergies      Current Outpatient Medications:     amLODIPine (NORVASC) 10 mg tablet, Take 1 tablet (10 mg total) by mouth daily, Disp: 60 tablet, Rfl: 1    FLUoxetine (PROzac) 20 mg capsule, TAKE 1 CAPSULE BY MOUTH EVERY DAY, Disp: 90 capsule, Rfl: 0    metoprolol tartrate (LOPRESSOR) 50 mg tablet, Take 1 tablet (50 mg total) by mouth every 12 (twelve) hours, Disp: 120 tablet, Rfl: 1    aspirin (ECOTRIN LOW STRENGTH) 81 mg EC tablet, Take 1 tablet by mouth daily (Patient not taking: Reported on 9/3/2024), Disp: 30 tablet, Rfl: 0    ondansetron (ZOFRAN-ODT) 4 mg disintegrating tablet, Take 1 tablet (4 mg total) by mouth every 6 (six) hours as needed for nausea or vomiting (Patient not taking: Reported on 1/24/2022), Disp: 20 tablet, Rfl: 0    Objective     Vitals:    09/03/24 1323   BP: 124/72   BP Location: Left arm   Pulse: 65   Temp: 97.6 °F (36.4 °C)   TempSrc: Tympanic   SpO2: 98%   Weight: 41.5 kg (91 lb 9.6 oz)   Height: 4' 10.2\" " (1.478 m)       Physical Exam  Vitals and nursing note reviewed.   Constitutional:       General: She is not in acute distress.     Appearance: She is well-developed.   HENT:      Head: Normocephalic and atraumatic.   Eyes:      Conjunctiva/sclera: Conjunctivae normal.   Cardiovascular:      Rate and Rhythm: Normal rate and regular rhythm.      Heart sounds: No murmur heard.  Pulmonary:      Effort: Pulmonary effort is normal. No respiratory distress.      Breath sounds: Normal breath sounds.   Abdominal:      Palpations: Abdomen is soft.      Tenderness: There is no abdominal tenderness.   Musculoskeletal:         General: No swelling.      Cervical back: Neck supple.   Skin:     General: Skin is warm and dry.      Capillary Refill: Capillary refill takes less than 2 seconds.   Neurological:      Mental Status: She is alert.   Psychiatric:         Mood and Affect: Mood normal.         Maya Roche MD  Family Medicine

## 2024-09-05 ENCOUNTER — HOSPITAL ENCOUNTER (OUTPATIENT)
Dept: GASTROENTEROLOGY | Facility: HOSPITAL | Age: 64
Setting detail: OUTPATIENT SURGERY
End: 2024-09-05
Attending: INTERNAL MEDICINE
Payer: COMMERCIAL

## 2024-09-05 ENCOUNTER — ANESTHESIA (OUTPATIENT)
Dept: GASTROENTEROLOGY | Facility: HOSPITAL | Age: 64
End: 2024-09-05

## 2024-09-05 ENCOUNTER — ANESTHESIA EVENT (OUTPATIENT)
Dept: GASTROENTEROLOGY | Facility: HOSPITAL | Age: 64
End: 2024-09-05

## 2024-09-05 VITALS
SYSTOLIC BLOOD PRESSURE: 140 MMHG | DIASTOLIC BLOOD PRESSURE: 82 MMHG | RESPIRATION RATE: 18 BRPM | HEART RATE: 72 BPM | TEMPERATURE: 97.5 F | OXYGEN SATURATION: 98 %

## 2024-09-05 DIAGNOSIS — Z12.11 SCREENING FOR COLON CANCER: ICD-10-CM

## 2024-09-05 PROCEDURE — 88305 TISSUE EXAM BY PATHOLOGIST: CPT | Performed by: PATHOLOGY

## 2024-09-05 PROCEDURE — 45380 COLONOSCOPY AND BIOPSY: CPT | Performed by: INTERNAL MEDICINE

## 2024-09-05 RX ORDER — PROPOFOL 10 MG/ML
INJECTION, EMULSION INTRAVENOUS CONTINUOUS PRN
Status: DISCONTINUED | OUTPATIENT
Start: 2024-09-05 | End: 2024-09-05

## 2024-09-05 RX ORDER — LIDOCAINE HYDROCHLORIDE 20 MG/ML
INJECTION, SOLUTION EPIDURAL; INFILTRATION; INTRACAUDAL; PERINEURAL AS NEEDED
Status: DISCONTINUED | OUTPATIENT
Start: 2024-09-05 | End: 2024-09-05

## 2024-09-05 RX ORDER — SODIUM CHLORIDE, SODIUM LACTATE, POTASSIUM CHLORIDE, CALCIUM CHLORIDE 600; 310; 30; 20 MG/100ML; MG/100ML; MG/100ML; MG/100ML
125 INJECTION, SOLUTION INTRAVENOUS CONTINUOUS
Status: DISCONTINUED | OUTPATIENT
Start: 2024-09-05 | End: 2024-09-09 | Stop reason: HOSPADM

## 2024-09-05 RX ADMIN — PROPOFOL 100 MG: 10 INJECTION, EMULSION INTRAVENOUS at 11:35

## 2024-09-05 RX ADMIN — SODIUM CHLORIDE, SODIUM LACTATE, POTASSIUM CHLORIDE, AND CALCIUM CHLORIDE 125 ML/HR: .6; .31; .03; .02 INJECTION, SOLUTION INTRAVENOUS at 09:18

## 2024-09-05 RX ADMIN — LIDOCAINE HYDROCHLORIDE 80 MG: 20 INJECTION, SOLUTION EPIDURAL; INFILTRATION; INTRACAUDAL; PERINEURAL at 11:35

## 2024-09-05 RX ADMIN — PROPOFOL 140 MCG/KG/MIN: 10 INJECTION, EMULSION INTRAVENOUS at 11:37

## 2024-09-05 NOTE — ANESTHESIA PREPROCEDURE EVALUATION
Procedure:  COLONOSCOPY    Relevant Problems   CARDIO   (+) Hypertension      NEURO/PSYCH   (+) Depression      Lab Results   Component Value Date    SODIUM 140 07/09/2024    K 3.4 (L) 07/09/2024     07/09/2024    CO2 27 07/09/2024    AGAP 13 07/09/2024    BUN 14 07/09/2024    CREATININE 0.90 07/09/2024    GLUC 96 07/09/2024    CALCIUM 10.2 07/09/2024    AST 24 07/09/2024    ALT 15 07/09/2024    ALKPHOS 80 07/09/2024    TP 8.1 07/09/2024    TBILI 0.76 07/09/2024    EGFR 68 07/09/2024     Lab Results   Component Value Date    WBC 7.87 07/09/2024    HGB 14.2 07/09/2024    HCT 42.0 07/09/2024     (H) 07/09/2024     07/09/2024         Physical Exam    Airway    Mallampati score: II  TM Distance: >3 FB  Neck ROM: full     Dental       Cardiovascular      Pulmonary      Other Findings  post-pubertal.      Anesthesia Plan  ASA Score- 2     Anesthesia Type- IV sedation with anesthesia with ASA Monitors.         Additional Monitors:     Airway Plan:            Plan Factors-Exercise tolerance (METS): >4 METS.    Chart reviewed. EKG reviewed. Imaging results reviewed. Existing labs reviewed. Patient summary reviewed.                  Induction- intravenous.    Postoperative Plan-         Informed Consent-

## 2024-09-05 NOTE — H&P
History and Physical - [facility]  Jojo Fong 64 y.o. female MRN: [id number]                  HPI: Jojo Fong is a 64 y.o. year old female who presents for crc screening      REVIEW OF SYSTEMS: Per the HPI, and otherwise unremarkable.    Historical Information   Past Medical History:   Diagnosis Date    Depression     Hypertension      Past Surgical History:   Procedure Laterality Date    TUMOR REMOVAL Right     right ear     Social History   Social History     Substance and Sexual Activity   Alcohol Use No    Comment: holiday     Social History     Substance and Sexual Activity   Drug Use No     Social History     Tobacco Use   Smoking Status Former    Current packs/day: 0.00    Average packs/day: 1 pack/day for 33.0 years (33.0 ttl pk-yrs)    Types: Cigarettes    Start date: 10/28/1975    Quit date: 10/28/2008    Years since quitting: 15.8    Passive exposure: Past   Smokeless Tobacco Never     Family History   Problem Relation Age of Onset    Breast cancer Mother     Cancer Mother     Lung cancer Father     Liver cancer Father        Meds/Allergies       Current Outpatient Medications:     amLODIPine (NORVASC) 10 mg tablet    FLUoxetine (PROzac) 20 mg capsule    metoprolol tartrate (LOPRESSOR) 50 mg tablet    aspirin (ECOTRIN LOW STRENGTH) 81 mg EC tablet    ondansetron (ZOFRAN-ODT) 4 mg disintegrating tablet    Current Facility-Administered Medications:     lactated ringers infusion, 125 mL/hr, Intravenous, Continuous, 125 mL/hr at 09/05/24 0918    No Known Allergies    Objective     /82   Pulse 70   Temp (!) 97.4 °F (36.3 °C) (Temporal)   Resp 18   SpO2 100%       PHYSICAL EXAM    Gen: NAD  Head: NCAT  CV: RRR  CHEST: not in respiratory distress  ABD: soft, NT/ND  EXT: no edema      ASSESSMENT/PLAN:  This is a 64 y.o. year old female here for colonoscopy, and she is stable and optimized for her procedure.

## 2024-09-05 NOTE — ANESTHESIA POSTPROCEDURE EVALUATION
Post-Op Assessment Note    CV Status:  Stable    Pain management: adequate       Mental Status:  Alert and awake   Hydration Status:  Euvolemic   PONV Controlled:  Controlled   Airway Patency:  Patent     Post Op Vitals Reviewed: Yes    No anethesia notable event occurred.    Staff: Anesthesiologist         Vitals:    09/05/24 1240   BP: 140/82   Pulse: 72   Resp: 18   Temp:    SpO2: 98%

## 2024-09-09 ENCOUNTER — TELEPHONE (OUTPATIENT)
Dept: FAMILY MEDICINE CLINIC | Facility: CLINIC | Age: 64
End: 2024-09-09

## 2024-09-09 NOTE — TELEPHONE ENCOUNTER
----- Message from eHri Land PA-C sent at 9/9/2024  3:57 PM EDT -----  Hi ladies,     Please call to inform patient that colonscopy results are normal. She will not be due for another colorectal cancer screening for 10 years, next in 2034.     Thank you !   Heri Land PA-C

## 2024-09-09 NOTE — TELEPHONE ENCOUNTER
Called patient to relay results below. Patient did not answer, left a voicemail to call the office at [phone].

## 2024-09-10 PROCEDURE — 88305 TISSUE EXAM BY PATHOLOGIST: CPT | Performed by: PATHOLOGY

## 2024-09-23 DIAGNOSIS — I10 PRIMARY HYPERTENSION: ICD-10-CM

## 2024-09-24 RX ORDER — AMLODIPINE BESYLATE 10 MG/1
10 TABLET ORAL DAILY
Qty: 90 TABLET | Refills: 1 | Status: SHIPPED | OUTPATIENT
Start: 2024-09-24

## 2024-09-24 RX ORDER — METOPROLOL TARTRATE 50 MG
50 TABLET ORAL EVERY 12 HOURS
Qty: 180 TABLET | Refills: 1 | Status: SHIPPED | OUTPATIENT
Start: 2024-09-24

## 2024-11-09 DIAGNOSIS — F32.4 MAJOR DEPRESSIVE DISORDER IN PARTIAL REMISSION, UNSPECIFIED WHETHER RECURRENT (HCC): ICD-10-CM

## 2025-03-19 DIAGNOSIS — I10 PRIMARY HYPERTENSION: ICD-10-CM

## 2025-03-19 RX ORDER — METOPROLOL TARTRATE 50 MG
50 TABLET ORAL EVERY 12 HOURS
Qty: 60 TABLET | Refills: 0 | Status: SHIPPED | OUTPATIENT
Start: 2025-03-19

## 2025-03-19 RX ORDER — AMLODIPINE BESYLATE 10 MG/1
10 TABLET ORAL DAILY
Qty: 30 TABLET | Refills: 0 | Status: SHIPPED | OUTPATIENT
Start: 2025-03-19

## 2025-03-19 NOTE — TELEPHONE ENCOUNTER
Patient needs an appointment. Please contact the patient to schedule an appointment. Last office visit: 9/3/24

## 2025-03-20 DIAGNOSIS — F32.4 MAJOR DEPRESSIVE DISORDER IN PARTIAL REMISSION, UNSPECIFIED WHETHER RECURRENT (HCC): ICD-10-CM

## 2025-03-20 NOTE — TELEPHONE ENCOUNTER
Patient was called and a voicemail was left to call [facility] and schedule a follow up visit .